# Patient Record
Sex: FEMALE | Race: WHITE | Employment: FULL TIME | URBAN - METROPOLITAN AREA
[De-identification: names, ages, dates, MRNs, and addresses within clinical notes are randomized per-mention and may not be internally consistent; named-entity substitution may affect disease eponyms.]

---
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
## 2020-04-27 ENCOUNTER — APPOINTMENT (OUTPATIENT)
Dept: CT IMAGING | Age: 20
End: 2020-04-27
Payer: COMMERCIAL

## 2020-04-27 ENCOUNTER — HOSPITAL ENCOUNTER (EMERGENCY)
Age: 20
Discharge: HOME | End: 2020-04-27
Attending: EMERGENCY MEDICINE
Payer: COMMERCIAL

## 2020-04-27 VITALS
RESPIRATION RATE: 18 BRPM | OXYGEN SATURATION: 98 % | TEMPERATURE: 98 F | WEIGHT: 226 LBS | DIASTOLIC BLOOD PRESSURE: 78 MMHG | SYSTOLIC BLOOD PRESSURE: 118 MMHG | HEART RATE: 102 BPM | BODY MASS INDEX: 35.4 KG/M2

## 2020-04-27 LAB
A/G RATIO: 1.4 (ref 1.1–2.2)
ALBUMIN SERPL-MCNC: 4.1 G/DL (ref 3.4–5)
ALP BLD-CCNC: 59 U/L (ref 40–129)
ALT SERPL-CCNC: 7 U/L (ref 10–40)
ANION GAP SERPL CALCULATED.3IONS-SCNC: 11 MMOL/L (ref 3–16)
AST SERPL-CCNC: 14 U/L (ref 15–37)
BACTERIA: ABNORMAL /HPF
BASOPHILS ABSOLUTE: 0 K/UL (ref 0–0.2)
BASOPHILS RELATIVE PERCENT: 0.3 %
BILIRUB SERPL-MCNC: 0.4 MG/DL (ref 0–1)
BILIRUBIN URINE: NEGATIVE
BLOOD, URINE: ABNORMAL
BUN BLDV-MCNC: 10 MG/DL (ref 7–20)
CALCIUM SERPL-MCNC: 9.3 MG/DL (ref 8.3–10.6)
CHLORIDE BLD-SCNC: 102 MMOL/L (ref 99–110)
CLARITY: CLEAR
CO2: 24 MMOL/L (ref 21–32)
COLOR: YELLOW
CREAT SERPL-MCNC: <0.5 MG/DL (ref 0.6–1.1)
CRYSTALS, UA: ABNORMAL /HPF
EOSINOPHILS ABSOLUTE: 0.1 K/UL (ref 0–0.6)
EOSINOPHILS RELATIVE PERCENT: 0.8 %
EPITHELIAL CELLS, UA: ABNORMAL /HPF (ref 0–5)
GFR AFRICAN AMERICAN: >60
GFR NON-AFRICAN AMERICAN: >60
GLOBULIN: 3 G/DL
GLUCOSE BLD-MCNC: 100 MG/DL (ref 70–99)
GLUCOSE URINE: NEGATIVE MG/DL
HCG QUALITATIVE: NEGATIVE
HCT VFR BLD CALC: 41.1 % (ref 36–48)
HEMOGLOBIN: 13.6 G/DL (ref 12–16)
KETONES, URINE: NEGATIVE MG/DL
LEUKOCYTE ESTERASE, URINE: NEGATIVE
LIPASE: 20 U/L (ref 13–60)
LYMPHOCYTES ABSOLUTE: 2.6 K/UL (ref 1–5.1)
LYMPHOCYTES RELATIVE PERCENT: 24.8 %
MCH RBC QN AUTO: 29.4 PG (ref 26–34)
MCHC RBC AUTO-ENTMCNC: 33 G/DL (ref 31–36)
MCV RBC AUTO: 88.9 FL (ref 80–100)
MICROSCOPIC EXAMINATION: YES
MONOCYTES ABSOLUTE: 0.7 K/UL (ref 0–1.3)
MONOCYTES RELATIVE PERCENT: 6.3 %
NEUTROPHILS ABSOLUTE: 7.2 K/UL (ref 1.7–7.7)
NEUTROPHILS RELATIVE PERCENT: 67.8 %
NITRITE, URINE: NEGATIVE
PDW BLD-RTO: 13.7 % (ref 12.4–15.4)
PH UA: 6 (ref 5–8)
PLATELET # BLD: 256 K/UL (ref 135–450)
PMV BLD AUTO: 8.3 FL (ref 5–10.5)
POTASSIUM SERPL-SCNC: 3.6 MMOL/L (ref 3.5–5.1)
PROTEIN UA: NEGATIVE MG/DL
RBC # BLD: 4.62 M/UL (ref 4–5.2)
RBC UA: ABNORMAL /HPF (ref 0–4)
SODIUM BLD-SCNC: 137 MMOL/L (ref 136–145)
SPECIFIC GRAVITY UA: 1.02 (ref 1–1.03)
TOTAL PROTEIN: 7.1 G/DL (ref 6.4–8.2)
URINE TYPE: ABNORMAL
UROBILINOGEN, URINE: 0.2 E.U./DL
WBC # BLD: 10.7 K/UL (ref 4–11)
WBC UA: ABNORMAL /HPF (ref 0–5)

## 2020-04-27 PROCEDURE — 96374 THER/PROPH/DIAG INJ IV PUSH: CPT

## 2020-04-27 PROCEDURE — 85025 COMPLETE CBC W/AUTO DIFF WBC: CPT

## 2020-04-27 PROCEDURE — 74176 CT ABD & PELVIS W/O CONTRAST: CPT

## 2020-04-27 PROCEDURE — 6370000000 HC RX 637 (ALT 250 FOR IP): Performed by: EMERGENCY MEDICINE

## 2020-04-27 PROCEDURE — 6360000002 HC RX W HCPCS: Performed by: EMERGENCY MEDICINE

## 2020-04-27 PROCEDURE — 84703 CHORIONIC GONADOTROPIN ASSAY: CPT

## 2020-04-27 PROCEDURE — 80053 COMPREHEN METABOLIC PANEL: CPT

## 2020-04-27 PROCEDURE — 99284 EMERGENCY DEPT VISIT MOD MDM: CPT

## 2020-04-27 PROCEDURE — 81001 URINALYSIS AUTO W/SCOPE: CPT

## 2020-04-27 PROCEDURE — 83690 ASSAY OF LIPASE: CPT

## 2020-04-27 RX ORDER — LANOLIN ALCOHOL/MO/W.PET/CERES
CREAM (GRAM) TOPICAL
Status: ON HOLD | COMMUNITY
Start: 2018-01-09 | End: 2020-05-20 | Stop reason: HOSPADM

## 2020-04-27 RX ORDER — HYDROCODONE BITARTRATE AND ACETAMINOPHEN 5; 325 MG/1; MG/1
1 TABLET ORAL ONCE
Status: COMPLETED | OUTPATIENT
Start: 2020-04-27 | End: 2020-04-27

## 2020-04-27 RX ORDER — ARIPIPRAZOLE 2 MG/1
TABLET ORAL
Status: ON HOLD | COMMUNITY
End: 2020-05-20 | Stop reason: HOSPADM

## 2020-04-27 RX ORDER — ACETAMINOPHEN AND CODEINE PHOSPHATE 300; 30 MG/1; MG/1
1 TABLET ORAL EVERY 4 HOURS PRN
Qty: 10 TABLET | Refills: 0 | Status: SHIPPED | OUTPATIENT
Start: 2020-04-27 | End: 2020-05-02

## 2020-04-27 RX ORDER — ONDANSETRON 4 MG/1
4 TABLET, ORALLY DISINTEGRATING ORAL EVERY 8 HOURS PRN
Qty: 10 TABLET | Refills: 0 | Status: ON HOLD | OUTPATIENT
Start: 2020-04-27 | End: 2020-05-20 | Stop reason: HOSPADM

## 2020-04-27 RX ORDER — KETOROLAC TROMETHAMINE 30 MG/ML
30 INJECTION, SOLUTION INTRAMUSCULAR; INTRAVENOUS ONCE
Status: COMPLETED | OUTPATIENT
Start: 2020-04-27 | End: 2020-04-27

## 2020-04-27 RX ADMIN — HYDROCODONE BITARTRATE AND ACETAMINOPHEN 1 TABLET: 5; 325 TABLET ORAL at 01:42

## 2020-04-27 RX ADMIN — KETOROLAC TROMETHAMINE 30 MG: 30 INJECTION, SOLUTION INTRAMUSCULAR at 01:04

## 2020-04-27 ASSESSMENT — PAIN SCALES - GENERAL: PAINLEVEL_OUTOF10: 8

## 2020-04-27 ASSESSMENT — PAIN DESCRIPTION - PAIN TYPE: TYPE: ACUTE PAIN

## 2020-04-27 ASSESSMENT — PAIN DESCRIPTION - LOCATION: LOCATION: ABDOMEN;BACK

## 2020-05-15 ENCOUNTER — HOSPITAL ENCOUNTER (EMERGENCY)
Age: 20
Discharge: ANOTHER HOSPITAL | End: 2020-05-16
Attending: EMERGENCY MEDICINE
Payer: COMMERCIAL

## 2020-05-15 PROCEDURE — 99285 EMERGENCY DEPT VISIT HI MDM: CPT

## 2020-05-16 ENCOUNTER — HOSPITAL ENCOUNTER (INPATIENT)
Age: 20
LOS: 4 days | Discharge: HOME | DRG: 885 | End: 2020-05-20
Attending: PSYCHIATRY & NEUROLOGY | Admitting: PSYCHIATRY & NEUROLOGY
Payer: COMMERCIAL

## 2020-05-16 VITALS
HEART RATE: 82 BPM | SYSTOLIC BLOOD PRESSURE: 148 MMHG | WEIGHT: 226 LBS | HEIGHT: 67 IN | DIASTOLIC BLOOD PRESSURE: 86 MMHG | TEMPERATURE: 98.1 F | BODY MASS INDEX: 35.47 KG/M2 | OXYGEN SATURATION: 100 % | RESPIRATION RATE: 12 BRPM

## 2020-05-16 PROBLEM — F60.3 BORDERLINE PERSONALITY DISORDER (HCC): Status: ACTIVE | Noted: 2020-05-16

## 2020-05-16 PROBLEM — F33.2 SEVERE EPISODE OF RECURRENT MAJOR DEPRESSIVE DISORDER, WITHOUT PSYCHOTIC FEATURES (HCC): Status: ACTIVE | Noted: 2020-05-16

## 2020-05-16 PROBLEM — F32.A DEPRESSION: Status: ACTIVE | Noted: 2020-05-16

## 2020-05-16 PROBLEM — F43.10 PTSD (POST-TRAUMATIC STRESS DISORDER): Status: ACTIVE | Noted: 2020-05-16

## 2020-05-16 LAB
A/G RATIO: 1.4 (ref 1.1–2.2)
ACETAMINOPHEN LEVEL: <5 UG/ML (ref 10–30)
ALBUMIN SERPL-MCNC: 4.2 G/DL (ref 3.4–5)
ALP BLD-CCNC: 54 U/L (ref 40–129)
ALT SERPL-CCNC: 9 U/L (ref 10–40)
AMPHETAMINE SCREEN, URINE: ABNORMAL
ANION GAP SERPL CALCULATED.3IONS-SCNC: 12 MMOL/L (ref 3–16)
AST SERPL-CCNC: 16 U/L (ref 15–37)
BARBITURATE SCREEN URINE: ABNORMAL
BASOPHILS ABSOLUTE: 0.1 K/UL (ref 0–0.2)
BASOPHILS RELATIVE PERCENT: 0.4 %
BENZODIAZEPINE SCREEN, URINE: ABNORMAL
BILIRUB SERPL-MCNC: 0.3 MG/DL (ref 0–1)
BUN BLDV-MCNC: 7 MG/DL (ref 7–20)
CALCIUM SERPL-MCNC: 9.2 MG/DL (ref 8.3–10.6)
CANNABINOID SCREEN URINE: POSITIVE
CHLORIDE BLD-SCNC: 104 MMOL/L (ref 99–110)
CO2: 23 MMOL/L (ref 21–32)
COCAINE METABOLITE SCREEN URINE: ABNORMAL
CREAT SERPL-MCNC: <0.5 MG/DL (ref 0.6–1.1)
EKG ATRIAL RATE: 96 BPM
EKG DIAGNOSIS: NORMAL
EKG P AXIS: 24 DEGREES
EKG P-R INTERVAL: 136 MS
EKG Q-T INTERVAL: 342 MS
EKG QRS DURATION: 78 MS
EKG QTC CALCULATION (BAZETT): 432 MS
EKG R AXIS: 40 DEGREES
EKG T AXIS: 30 DEGREES
EKG VENTRICULAR RATE: 96 BPM
EOSINOPHILS ABSOLUTE: 0.1 K/UL (ref 0–0.6)
EOSINOPHILS RELATIVE PERCENT: 0.7 %
ETHANOL: NORMAL MG/DL (ref 0–0.08)
GFR AFRICAN AMERICAN: >60
GFR NON-AFRICAN AMERICAN: >60
GLOBULIN: 2.9 G/DL
GLUCOSE BLD-MCNC: 102 MG/DL (ref 70–99)
HCG(URINE) PREGNANCY TEST: NEGATIVE
HCT VFR BLD CALC: 40.6 % (ref 36–48)
HEMOGLOBIN: 13.7 G/DL (ref 12–16)
LITHIUM DOSE AMOUNT: ABNORMAL
LITHIUM LEVEL: 0.3 MMOL/L (ref 0.6–1.2)
LYMPHOCYTES ABSOLUTE: 3.3 K/UL (ref 1–5.1)
LYMPHOCYTES RELATIVE PERCENT: 24.8 %
Lab: ABNORMAL
MCH RBC QN AUTO: 29.6 PG (ref 26–34)
MCHC RBC AUTO-ENTMCNC: 33.8 G/DL (ref 31–36)
MCV RBC AUTO: 87.7 FL (ref 80–100)
METHADONE SCREEN, URINE: ABNORMAL
MONOCYTES ABSOLUTE: 0.7 K/UL (ref 0–1.3)
MONOCYTES RELATIVE PERCENT: 5 %
NEUTROPHILS ABSOLUTE: 9.3 K/UL (ref 1.7–7.7)
NEUTROPHILS RELATIVE PERCENT: 69.1 %
OPIATE SCREEN URINE: ABNORMAL
OXYCODONE URINE: ABNORMAL
PDW BLD-RTO: 12.9 % (ref 12.4–15.4)
PH UA: 6
PHENCYCLIDINE SCREEN URINE: ABNORMAL
PLATELET # BLD: 259 K/UL (ref 135–450)
PMV BLD AUTO: 8.4 FL (ref 5–10.5)
POTASSIUM REFLEX MAGNESIUM: 3.8 MMOL/L (ref 3.5–5.1)
PROPOXYPHENE SCREEN: ABNORMAL
RBC # BLD: 4.63 M/UL (ref 4–5.2)
SALICYLATE, SERUM: <0.3 MG/DL (ref 15–30)
SARS-COV-2, NAAT: NOT DETECTED
SODIUM BLD-SCNC: 139 MMOL/L (ref 136–145)
TOTAL PROTEIN: 7.1 G/DL (ref 6.4–8.2)
WBC # BLD: 13.5 K/UL (ref 4–11)

## 2020-05-16 PROCEDURE — 80178 ASSAY OF LITHIUM: CPT

## 2020-05-16 PROCEDURE — 6370000000 HC RX 637 (ALT 250 FOR IP): Performed by: PSYCHIATRY & NEUROLOGY

## 2020-05-16 PROCEDURE — 99222 1ST HOSP IP/OBS MODERATE 55: CPT | Performed by: PHYSICIAN ASSISTANT

## 2020-05-16 PROCEDURE — G0480 DRUG TEST DEF 1-7 CLASSES: HCPCS

## 2020-05-16 PROCEDURE — 93005 ELECTROCARDIOGRAM TRACING: CPT | Performed by: EMERGENCY MEDICINE

## 2020-05-16 PROCEDURE — 84703 CHORIONIC GONADOTROPIN ASSAY: CPT

## 2020-05-16 PROCEDURE — 85025 COMPLETE CBC W/AUTO DIFF WBC: CPT

## 2020-05-16 PROCEDURE — 99223 1ST HOSP IP/OBS HIGH 75: CPT | Performed by: PSYCHIATRY & NEUROLOGY

## 2020-05-16 PROCEDURE — 80053 COMPREHEN METABOLIC PANEL: CPT

## 2020-05-16 PROCEDURE — U0002 COVID-19 LAB TEST NON-CDC: HCPCS

## 2020-05-16 PROCEDURE — 93010 ELECTROCARDIOGRAM REPORT: CPT | Performed by: INTERNAL MEDICINE

## 2020-05-16 PROCEDURE — 80307 DRUG TEST PRSMV CHEM ANLYZR: CPT

## 2020-05-16 PROCEDURE — 1240000000 HC EMOTIONAL WELLNESS R&B

## 2020-05-16 PROCEDURE — 6370000000 HC RX 637 (ALT 250 FOR IP): Performed by: EMERGENCY MEDICINE

## 2020-05-16 RX ORDER — DULOXETIN HYDROCHLORIDE 30 MG/1
30 CAPSULE, DELAYED RELEASE ORAL 2 TIMES DAILY
Status: DISCONTINUED | OUTPATIENT
Start: 2020-05-16 | End: 2020-05-20 | Stop reason: HOSPADM

## 2020-05-16 RX ORDER — FERROUS SULFATE 325(65) MG
325 TABLET ORAL 2 TIMES DAILY WITH MEALS
Status: DISCONTINUED | OUTPATIENT
Start: 2020-05-16 | End: 2020-05-20 | Stop reason: HOSPADM

## 2020-05-16 RX ORDER — NICOTINE 21 MG/24HR
1 PATCH, TRANSDERMAL 24 HOURS TRANSDERMAL ONCE
Status: DISCONTINUED | OUTPATIENT
Start: 2020-05-16 | End: 2020-05-16 | Stop reason: HOSPADM

## 2020-05-16 RX ORDER — BUSPIRONE HYDROCHLORIDE 10 MG/1
10 TABLET ORAL 3 TIMES DAILY
Status: DISCONTINUED | OUTPATIENT
Start: 2020-05-16 | End: 2020-05-19

## 2020-05-16 RX ORDER — LITHIUM CARBONATE 300 MG/1
600 CAPSULE ORAL
Status: ON HOLD | COMMUNITY
End: 2020-05-20 | Stop reason: HOSPADM

## 2020-05-16 RX ORDER — IBUPROFEN 400 MG/1
400 TABLET ORAL EVERY 6 HOURS PRN
Status: DISCONTINUED | OUTPATIENT
Start: 2020-05-16 | End: 2020-05-20 | Stop reason: HOSPADM

## 2020-05-16 RX ORDER — HYDROXYZINE PAMOATE 50 MG/1
50 CAPSULE ORAL 3 TIMES DAILY PRN
Status: DISCONTINUED | OUTPATIENT
Start: 2020-05-16 | End: 2020-05-20 | Stop reason: HOSPADM

## 2020-05-16 RX ORDER — MAGNESIUM HYDROXIDE/ALUMINUM HYDROXICE/SIMETHICONE 120; 1200; 1200 MG/30ML; MG/30ML; MG/30ML
30 SUSPENSION ORAL EVERY 6 HOURS PRN
Status: DISCONTINUED | OUTPATIENT
Start: 2020-05-16 | End: 2020-05-20 | Stop reason: HOSPADM

## 2020-05-16 RX ORDER — BENZTROPINE MESYLATE 1 MG/ML
2 INJECTION INTRAMUSCULAR; INTRAVENOUS 2 TIMES DAILY PRN
Status: DISCONTINUED | OUTPATIENT
Start: 2020-05-16 | End: 2020-05-18

## 2020-05-16 RX ORDER — LITHIUM CARBONATE 300 MG/1
300 TABLET, FILM COATED, EXTENDED RELEASE ORAL EVERY 12 HOURS SCHEDULED
Status: DISCONTINUED | OUTPATIENT
Start: 2020-05-16 | End: 2020-05-20 | Stop reason: HOSPADM

## 2020-05-16 RX ORDER — TRAZODONE HYDROCHLORIDE 50 MG/1
50 TABLET ORAL NIGHTLY
Status: DISCONTINUED | OUTPATIENT
Start: 2020-05-16 | End: 2020-05-20 | Stop reason: HOSPADM

## 2020-05-16 RX ORDER — NICOTINE 21 MG/24HR
1 PATCH, TRANSDERMAL 24 HOURS TRANSDERMAL DAILY
Status: DISCONTINUED | OUTPATIENT
Start: 2020-05-16 | End: 2020-05-17

## 2020-05-16 RX ORDER — DULOXETIN HYDROCHLORIDE 60 MG/1
60 CAPSULE, DELAYED RELEASE ORAL 2 TIMES DAILY
Status: DISCONTINUED | OUTPATIENT
Start: 2020-05-16 | End: 2020-05-16

## 2020-05-16 RX ORDER — TRAZODONE HYDROCHLORIDE 50 MG/1
50 TABLET ORAL NIGHTLY PRN
Status: DISCONTINUED | OUTPATIENT
Start: 2020-05-16 | End: 2020-05-16

## 2020-05-16 RX ORDER — ACETAMINOPHEN 325 MG/1
650 TABLET ORAL EVERY 4 HOURS PRN
Status: DISCONTINUED | OUTPATIENT
Start: 2020-05-16 | End: 2020-05-20 | Stop reason: HOSPADM

## 2020-05-16 RX ORDER — DULOXETIN HYDROCHLORIDE 30 MG/1
30 CAPSULE, DELAYED RELEASE ORAL
Status: ON HOLD | COMMUNITY
End: 2020-05-20 | Stop reason: HOSPADM

## 2020-05-16 RX ORDER — OLANZAPINE 10 MG/1
10 TABLET ORAL
Status: ACTIVE | OUTPATIENT
Start: 2020-05-16 | End: 2020-05-16

## 2020-05-16 RX ORDER — OLANZAPINE 10 MG/1
10 INJECTION, POWDER, LYOPHILIZED, FOR SOLUTION INTRAMUSCULAR
Status: ACTIVE | OUTPATIENT
Start: 2020-05-16 | End: 2020-05-16

## 2020-05-16 RX ORDER — LITHIUM CARBONATE 300 MG/1
600 TABLET, FILM COATED, EXTENDED RELEASE ORAL EVERY 12 HOURS SCHEDULED
Status: DISCONTINUED | OUTPATIENT
Start: 2020-05-16 | End: 2020-05-16

## 2020-05-16 RX ADMIN — BUSPIRONE HYDROCHLORIDE 10 MG: 10 TABLET ORAL at 19:21

## 2020-05-16 RX ADMIN — HYDROXYZINE PAMOATE 50 MG: 50 CAPSULE ORAL at 18:03

## 2020-05-16 RX ADMIN — NICOTINE POLACRILEX 2 MG: 2 GUM, CHEWING BUCCAL at 11:30

## 2020-05-16 RX ADMIN — HYDROXYZINE PAMOATE 50 MG: 50 CAPSULE ORAL at 13:17

## 2020-05-16 RX ADMIN — DULOXETINE HYDROCHLORIDE 60 MG: 60 CAPSULE, DELAYED RELEASE ORAL at 11:52

## 2020-05-16 RX ADMIN — NICOTINE POLACRILEX 2 MG: 2 GUM, CHEWING BUCCAL at 15:30

## 2020-05-16 RX ADMIN — LITHIUM CARBONATE 300 MG: 300 TABLET, FILM COATED, EXTENDED RELEASE ORAL at 21:08

## 2020-05-16 RX ADMIN — DULOXETINE HYDROCHLORIDE 30 MG: 30 CAPSULE, DELAYED RELEASE ORAL at 21:08

## 2020-05-16 RX ADMIN — LITHIUM CARBONATE 600 MG: 300 TABLET, FILM COATED, EXTENDED RELEASE ORAL at 11:52

## 2020-05-16 RX ADMIN — NICOTINE POLACRILEX 2 MG: 2 GUM, CHEWING BUCCAL at 18:03

## 2020-05-16 RX ADMIN — FERROUS SULFATE TAB 325 MG (65 MG ELEMENTAL FE) 325 MG: 325 (65 FE) TAB at 18:06

## 2020-05-16 RX ADMIN — TRAZODONE HYDROCHLORIDE 50 MG: 50 TABLET ORAL at 21:08

## 2020-05-16 RX ADMIN — NICOTINE POLACRILEX 2 MG: 2 GUM, CHEWING BUCCAL at 21:40

## 2020-05-16 ASSESSMENT — SLEEP AND FATIGUE QUESTIONNAIRES
DIFFICULTY STAYING ASLEEP: YES
DIFFICULTY ARISING: NO
SLEEP PATTERN: DIFFICULTY FALLING ASLEEP;DISTURBED/INTERRUPTED SLEEP;NIGHTMARES/TERRORS;DIFFICULTY ARISING
DO YOU HAVE DIFFICULTY SLEEPING: YES
RESTFUL SLEEP: NO
DIFFICULTY FALLING ASLEEP: NO
SLEEP PATTERN: INSOMNIA;RESTLESSNESS
DO YOU USE A SLEEP AID: NO
DIFFICULTY ARISING: YES
DO YOU USE A SLEEP AID: YES
AVERAGE NUMBER OF SLEEP HOURS: 6
DIFFICULTY STAYING ASLEEP: NO
DIFFICULTY FALLING ASLEEP: YES
DO YOU HAVE DIFFICULTY SLEEPING: YES
RESTFUL SLEEP: NO
AVERAGE NUMBER OF SLEEP HOURS: 4

## 2020-05-16 ASSESSMENT — PATIENT HEALTH QUESTIONNAIRE - PHQ9
SUM OF ALL RESPONSES TO PHQ QUESTIONS 1-9: 23
SUM OF ALL RESPONSES TO PHQ QUESTIONS 1-9: 15

## 2020-05-16 ASSESSMENT — LIFESTYLE VARIABLES
HISTORY_ALCOHOL_USE: NO
HISTORY_ALCOHOL_USE: NO

## 2020-05-16 NOTE — BH NOTE
[facility]  Initial Interdisciplinary Treatment Plan NOTE    Review Date & Time: 05/16/2020 0900    Patient was not in treatment team    Admission Type:   Admission Type: Voluntary    Reason for admission:  Reason for Admission: threatened to committ suicide, overhwlemed, multiple stressors. Estimated Length of Stay Update:  1-3 days   Estimated Discharge Date Update: 1-3 days     PATIENT STRENGTHS:  Patient Strengths Strengths: Communication, Positive Support, Connection to output provider, Social Skills, Employment  Patient Strengths and Limitations:Limitations: Tendency to isolate self, External locus of control  Addictive Behavior:Addictive Behavior  In the past 3 months, have you felt or has someone told you that you have a problem with:  : None  Do you have a history of Chemical Use?: No  Do you have a history of Alcohol Use?: No  Do you have a history of Street Drug Abuse?: Yes  Histroy of Prescripton Drug Abuse?: No  Medical Problems:  Past Medical History:   Diagnosis Date    Depression     Lissa-Danlos syndrome        EDUCATION:   Learner Progress Toward Treatment Goals: Reviewed results and recommendations of this team    Method: Individual    Outcome: Verbalized understanding    PATIENT GOALS: \" to talk with doctor. \"     PLAN/TREATMENT RECOMMENDATIONS UPDATE: maintain safety, medication management     GOALS UPDATE:   Time frame for Short-Term Goals:  By time of discharge     Cj Watson RN

## 2020-05-16 NOTE — BH NOTE
Pt c/o increased anxiety, rpts vistaril has not been effective  Having fleeting thoughts of self-harming (hx of biting self on hand). But has not harmed self presently and does not think she will, \"do it\"  Schedule Buspar given for anxiety. PT does contract for safety. Prince Meyers

## 2020-05-16 NOTE — ED PROVIDER NOTES
visualized and preliminarily interpreted by the emergency physician with the below findings:        Interpretation per the Radiologist below, if available at the time of this note:    No orders to display         ED BEDSIDE ULTRASOUND:   Performed by ED Physician - none    LABS:  Labs Reviewed   CBC WITH AUTO DIFFERENTIAL - Abnormal; Notable for the following components:       Result Value    WBC 13.5 (*)     Neutrophils Absolute 9.3 (*)     All other components within normal limits    Narrative:     Performed at:  [address], [address] [place]   Phone [phone]   COMPREHENSIVE METABOLIC PANEL W/ REFLEX TO MG FOR LOW K - Abnormal; Notable for the following components:    Glucose 102 (*)     CREATININE <0.5 (*)     ALT 9 (*)     All other components within normal limits    Narrative:     Performed at:  [address], [email or web address] [place]   Phone [phone] LEVEL - Abnormal; Notable for the following components:    Acetaminophen Level <5 (*)     All other components within normal limits    Narrative:     Performed at:  [address], [facility] [place]   Phone [phone]   SALICYLATE LEVEL - Abnormal; Notable for the following components:    Salicylate, Serum <7.7 (*)     All other components within normal limits    Narrative:     Performed at:  [address],  [place], [address] [place]   Phone [phone]   LITHIUM LEVEL - Abnormal; Notable for the following components:    Lithium Lvl 0.3 (*)     All other components within normal limits    Narrative:     Performed at:  [address], [address] [place]   Phone [phone]   PREGNANCY, URINE    Narrative:     Performed at:  [address] [place] [address] Laboratory  [address] [place] [address], [place], [place]   Phone [phone]   URINE DRUG SCREEN    Narrative:     Performed at:  [facility]) [facility]  [address] [place] [address],  [place] Ramírez [place]   Phone [phone]   ETHANOL    Narrative:     Performed at:  [facility]) [address],  [place]   Phone [phone]       All other labs were within normal range or not returned as of this dictation. EMERGENCY DEPARTMENT COURSE and DIFFERENTIAL DIAGNOSIS/MDM:   Vitals:    Vitals:    05/16/20 0004   BP: 130/76   Pulse: 97   Resp: 16   Temp: 98.1 °F (36.7 °C)   TempSrc: Oral   SpO2: 97%   Weight: 226 lb (102.5 kg)   Height: 5' 7\" (1.702 m)     Adult female who comes in for suicidal ideation. Patient is placed on suicide precautions. No medical complaints. Basic laboratory studies are unremarkable except for nonspecific mild leukocytosis. At this point time patient is medically cleared for psychiatric evaluation. Patient has been accepted by Dr. Francisco Mercado for inpatient psychiatric evaluation. CRITICAL CARE TIME   None       CONSULTS:  IP CONSULT TO HOSPITALIST    PROCEDURES:  Unless otherwise noted above, none     Procedures    FINAL IMPRESSION      1. Depression with suicidal ideation          DISPOSITION/PLAN   DISPOSITION        PATIENT REFERREDTO:  No follow-up provider specified.     DISCHARGEMEDICATIONS:  New Prescriptions    No medications on file          (Please note that portions of this note were completed with a voice recognition program.  Efforts were made to edit the dictations but occasionally words are mis-transcribed.)    Maddie Roy MD (electronically signed)  Attending Emergency Physician        Maddie Roy MD  05/16/20 Fredy Boone MD  05/16/20 0152

## 2020-05-16 NOTE — BH NOTE
Called to verify home medications at [facility]     Lithium 300 mg Daily   Zyprexa 2.5 mg 1-2 tablets  Cymbalta 60 mg BID  Zofran 4mg TID PRN

## 2020-05-16 NOTE — ED NOTES
Consent for Telehealth Services signed by pt and witnessed by this RN.       Harvey Cardona RN  05/16/20 4264

## 2020-05-16 NOTE — H&P
and Morphine    Social History:  The patient currently lives with family. TOBACCO:   reports that she has never smoked. She has never used smokeless tobacco.  ETOH:   reports no history of alcohol use. Family History:   Positive as follows:        Problem Relation Age of Onset    Mental Illness Mother     Other Mother     Mental Illness Maternal Cousin        REVIEW OF SYSTEMS:     Constitutional: Negative for fever   HENT: Negative for sore throat   Eyes: Negative for redness   Respiratory: Negative  for dyspnea, cough   Cardiovascular: Negative for chest pain   Gastrointestinal: Negative for vomiting, diarrhea   Genitourinary: Negative for hematuria   Musculoskeletal: Negative for arthralgias   Skin: Negative for rash   Neurological: Negative for syncope   Hematological: Negative for adenopathy   Psychiatric/Behavorial: Negative for anxiety    PHYSICAL EXAM:    /82   Pulse 88   Temp 98.8 °F (37.1 °C)   Resp 18   LMP 04/20/2020 (Exact Date)   SpO2 99%   Breastfeeding No   Gen: No distress. Alert. Young, obese,  female  Eyes: PERRL. No sclera icterus. No conjunctival injection. ENT: No discharge. Pharynx clear. Neck:  Trachea midline. Resp: No accessory muscle use. No crackles. No wheezes. No rhonchi. CV: Regular rate. Regular rhythm. No murmur. No rub. No edema. GI: Soft, Non-tender. Non-distended. Normal bowel sounds. Skin: Warm and dry. No rash on exposed extremities. M/S: No cyanosis. No clubbing. Neuro: Awake.  Grossly nonfocal, CN II-XII intact    Psych: Defer to psychiatry    CBC:   Recent Labs     05/16/20  0010   WBC 13.5*   HGB 13.7   HCT 40.6   MCV 87.7        BMP:   Recent Labs     05/16/20  0010      K 3.8      CO2 23   BUN 7   CREATININE <0.5*     LIVER PROFILE:   Recent Labs     05/16/20  0010   AST 16   ALT 9*   BILITOT 0.3   ALKPHOS 54     UA:  Recent Labs     05/16/20 0030   PHUR 6.0      U/A:    Lab Results   Component Value Date
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
## 2020-05-16 NOTE — PROGRESS NOTES
Patient belongings placed in patient bag - bra, shirt, pants, purse, cell phone, pair of shoes. Patient placed in paper safety gown.

## 2020-05-17 LAB
CHOLESTEROL, TOTAL: 197 MG/DL (ref 0–199)
ESTIMATED AVERAGE GLUCOSE: 91.1 MG/DL
HBA1C MFR BLD: 4.8 %
HDLC SERPL-MCNC: 87 MG/DL (ref 40–60)
LDL CHOLESTEROL CALCULATED: 94 MG/DL
TRIGL SERPL-MCNC: 78 MG/DL (ref 0–150)
TSH SERPL DL<=0.05 MIU/L-ACNC: 1.12 UIU/ML (ref 0.27–4.2)
VLDLC SERPL CALC-MCNC: 16 MG/DL

## 2020-05-17 PROCEDURE — 36415 COLL VENOUS BLD VENIPUNCTURE: CPT

## 2020-05-17 PROCEDURE — 83036 HEMOGLOBIN GLYCOSYLATED A1C: CPT

## 2020-05-17 PROCEDURE — 80061 LIPID PANEL: CPT

## 2020-05-17 PROCEDURE — 6370000000 HC RX 637 (ALT 250 FOR IP): Performed by: PSYCHIATRY & NEUROLOGY

## 2020-05-17 PROCEDURE — 1240000000 HC EMOTIONAL WELLNESS R&B

## 2020-05-17 PROCEDURE — 84443 ASSAY THYROID STIM HORMONE: CPT

## 2020-05-17 RX ORDER — NICOTINE 21 MG/24HR
1 PATCH, TRANSDERMAL 24 HOURS TRANSDERMAL DAILY PRN
Status: DISCONTINUED | OUTPATIENT
Start: 2020-05-17 | End: 2020-05-20 | Stop reason: HOSPADM

## 2020-05-17 RX ORDER — LORAZEPAM 1 MG/1
1 TABLET ORAL EVERY 12 HOURS PRN
Status: DISCONTINUED | OUTPATIENT
Start: 2020-05-17 | End: 2020-05-18

## 2020-05-17 RX ADMIN — BUSPIRONE HYDROCHLORIDE 10 MG: 10 TABLET ORAL at 19:59

## 2020-05-17 RX ADMIN — LORAZEPAM 1 MG: 1 TABLET ORAL at 10:02

## 2020-05-17 RX ADMIN — BUSPIRONE HYDROCHLORIDE 10 MG: 10 TABLET ORAL at 08:00

## 2020-05-17 RX ADMIN — LORAZEPAM 1 MG: 1 TABLET ORAL at 22:13

## 2020-05-17 RX ADMIN — NICOTINE POLACRILEX 2 MG: 2 GUM, CHEWING BUCCAL at 17:44

## 2020-05-17 RX ADMIN — LITHIUM CARBONATE 300 MG: 300 TABLET, FILM COATED, EXTENDED RELEASE ORAL at 08:00

## 2020-05-17 RX ADMIN — FERROUS SULFATE TAB 325 MG (65 MG ELEMENTAL FE) 325 MG: 325 (65 FE) TAB at 08:00

## 2020-05-17 RX ADMIN — NICOTINE POLACRILEX 2 MG: 2 GUM, CHEWING BUCCAL at 08:40

## 2020-05-17 RX ADMIN — TRAZODONE HYDROCHLORIDE 50 MG: 50 TABLET ORAL at 19:58

## 2020-05-17 RX ADMIN — HYDROXYZINE PAMOATE 50 MG: 50 CAPSULE ORAL at 03:32

## 2020-05-17 RX ADMIN — FERROUS SULFATE TAB 325 MG (65 MG ELEMENTAL FE) 325 MG: 325 (65 FE) TAB at 17:45

## 2020-05-17 RX ADMIN — LITHIUM CARBONATE 300 MG: 300 TABLET, FILM COATED, EXTENDED RELEASE ORAL at 19:59

## 2020-05-17 RX ADMIN — DULOXETINE HYDROCHLORIDE 30 MG: 30 CAPSULE, DELAYED RELEASE ORAL at 19:59

## 2020-05-17 RX ADMIN — NICOTINE POLACRILEX 4 MG: 4 GUM, CHEWING BUCCAL at 19:11

## 2020-05-17 RX ADMIN — NICOTINE POLACRILEX 2 MG: 2 GUM, CHEWING BUCCAL at 13:45

## 2020-05-17 RX ADMIN — NICOTINE POLACRILEX 4 MG: 4 GUM, CHEWING BUCCAL at 22:16

## 2020-05-17 RX ADMIN — BUSPIRONE HYDROCHLORIDE 10 MG: 10 TABLET ORAL at 15:44

## 2020-05-17 RX ADMIN — DULOXETINE HYDROCHLORIDE 30 MG: 30 CAPSULE, DELAYED RELEASE ORAL at 08:00

## 2020-05-17 ASSESSMENT — PAIN SCALES - GENERAL: PAINLEVEL_OUTOF10: 0

## 2020-05-17 NOTE — PLAN OF CARE
Problem: Anxiety:  Goal: Level of anxiety will decrease  Description: Level of anxiety will decrease  Outcome: Ongoing   Ev reports anxiety this shift. Patient states her suicidal thoughts are \"always there. \" Patient denies HI, denies A/V/H and contracts for safety. PRN Vistaril given as requested for anxiety. Medication effective.

## 2020-05-17 NOTE — PLAN OF CARE
Problem: Suicide risk  Description: Suicide risk  Goal: Provide patient with safe environment  Description: Provide patient with safe environment  5/17/2020 1014 by Jamey Ordaz RN  Note: Patient denies suicidal ideation. She states that she is feeling good this morning and that she believes the medication changes made yesterday were beneficial. Continues to endorse anxiety.

## 2020-05-17 NOTE — GROUP NOTE
Group Therapy Note    Date: 5/17/2020    Group Start Time: 1:30 pm  Group End Time: 2:30 pm  Group Topic: Cognitive Skills    [address]        Group Therapy Note    Attendees: 6       Patient's Goal: Pt will participate in yoga and meditation. Notes: Pt participated and was appropriate in group. Status After Intervention:  Improved    Participation Level:  Active Listener and Interactive    Participation Quality: Appropriate, Attentive, Sharing and Supportive      Speech:  normal      Thought Process/Content: Logical      Affective Functioning: Congruent      Mood: euthymic      Level of consciousness:  Alert and Oriented x4      Response to Learning: Able to verbalize current knowledge/experience, Able to verbalize/acknowledge new learning and Progressing to goal      Endings: None Reported    Modes of Intervention: Activity and Movement      Discipline Responsible: /Counselor      Signature:  GHULAM Ibarra
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
## 2020-05-18 PROCEDURE — 6370000000 HC RX 637 (ALT 250 FOR IP): Performed by: PSYCHIATRY & NEUROLOGY

## 2020-05-18 PROCEDURE — 97165 OT EVAL LOW COMPLEX 30 MIN: CPT

## 2020-05-18 PROCEDURE — 1240000000 HC EMOTIONAL WELLNESS R&B

## 2020-05-18 PROCEDURE — 99233 SBSQ HOSP IP/OBS HIGH 50: CPT | Performed by: PSYCHIATRY & NEUROLOGY

## 2020-05-18 PROCEDURE — 97535 SELF CARE MNGMENT TRAINING: CPT

## 2020-05-18 RX ORDER — HYDROXYZINE PAMOATE 25 MG/1
25 CAPSULE ORAL
Status: ACTIVE | OUTPATIENT
Start: 2020-05-18 | End: 2020-05-18

## 2020-05-18 RX ORDER — CLONAZEPAM 0.5 MG/1
0.5 TABLET ORAL EVERY 6 HOURS PRN
Status: DISCONTINUED | OUTPATIENT
Start: 2020-05-18 | End: 2020-05-19

## 2020-05-18 RX ADMIN — NICOTINE POLACRILEX 4 MG: 4 GUM, CHEWING BUCCAL at 22:42

## 2020-05-18 RX ADMIN — LORAZEPAM 1 MG: 1 TABLET ORAL at 10:23

## 2020-05-18 RX ADMIN — FERROUS SULFATE TAB 325 MG (65 MG ELEMENTAL FE) 325 MG: 325 (65 FE) TAB at 08:30

## 2020-05-18 RX ADMIN — NICOTINE POLACRILEX 4 MG: 4 GUM, CHEWING BUCCAL at 14:18

## 2020-05-18 RX ADMIN — LITHIUM CARBONATE 300 MG: 300 TABLET, FILM COATED, EXTENDED RELEASE ORAL at 08:30

## 2020-05-18 RX ADMIN — BUSPIRONE HYDROCHLORIDE 10 MG: 10 TABLET ORAL at 20:35

## 2020-05-18 RX ADMIN — BUSPIRONE HYDROCHLORIDE 10 MG: 10 TABLET ORAL at 14:18

## 2020-05-18 RX ADMIN — NICOTINE POLACRILEX 4 MG: 4 GUM, CHEWING BUCCAL at 20:35

## 2020-05-18 RX ADMIN — NICOTINE POLACRILEX 4 MG: 4 GUM, CHEWING BUCCAL at 10:23

## 2020-05-18 RX ADMIN — HYDROXYZINE PAMOATE 50 MG: 50 CAPSULE ORAL at 19:42

## 2020-05-18 RX ADMIN — TRAZODONE HYDROCHLORIDE 50 MG: 50 TABLET ORAL at 20:35

## 2020-05-18 RX ADMIN — CLONAZEPAM 0.5 MG: 0.5 TABLET ORAL at 19:42

## 2020-05-18 RX ADMIN — FERROUS SULFATE TAB 325 MG (65 MG ELEMENTAL FE) 325 MG: 325 (65 FE) TAB at 17:37

## 2020-05-18 RX ADMIN — CLONAZEPAM 0.5 MG: 0.5 TABLET ORAL at 13:17

## 2020-05-18 RX ADMIN — BUSPIRONE HYDROCHLORIDE 10 MG: 10 TABLET ORAL at 08:30

## 2020-05-18 RX ADMIN — DULOXETINE HYDROCHLORIDE 30 MG: 30 CAPSULE, DELAYED RELEASE ORAL at 20:35

## 2020-05-18 RX ADMIN — NICOTINE POLACRILEX 4 MG: 4 GUM, CHEWING BUCCAL at 17:38

## 2020-05-18 RX ADMIN — HYDROXYZINE PAMOATE 50 MG: 50 CAPSULE ORAL at 15:12

## 2020-05-18 RX ADMIN — DULOXETINE HYDROCHLORIDE 30 MG: 30 CAPSULE, DELAYED RELEASE ORAL at 08:30

## 2020-05-18 RX ADMIN — NICOTINE POLACRILEX 4 MG: 4 GUM, CHEWING BUCCAL at 08:30

## 2020-05-18 RX ADMIN — LITHIUM CARBONATE 300 MG: 300 TABLET, FILM COATED, EXTENDED RELEASE ORAL at 20:35

## 2020-05-18 RX ADMIN — HYDROXYZINE PAMOATE 50 MG: 50 CAPSULE ORAL at 04:43

## 2020-05-18 NOTE — PROGRESS NOTES
Department of Psychiatry  Attending Progress Note  Chief Complaint: depression  Beverly Worthy has been active in program and bright affectively. She stated that her anxiety remains elevated and ATivan is not helpful . She is new to [place]. She spent most of our time talking a bout how she would like to move out with a friend to [place]. Dad will be deployed next week to [facility] for up to a year, so patient is worried about her mother's health(Lupus) but is still planning to move out of parent's home. Plans to enroll in EMT school at [employer]. Not currently suicidal.   Discussed changing Ativan to Clonazepam temporarily. Patient's chart was reviewed and collaborated with  about the treatment plan. SUBJECTIVE:    Patient is feeling better. Suicidal ideation:  denies suicidal ideation. Patient does not have medication side effects. ROS: Patient has new complaints: no  Sleeping adequately:  Yes   Appetite adequate: Yes  Attending groups: Yes  Visitors:No    OBJECTIVE    Physical  VITALS:  /86   Pulse 107   Temp 98.2 °F (36.8 °C) (Oral)   Resp 14   Ht 5' 8\" (1.727 m)   Wt 240 lb (108.9 kg)   LMP 04/20/2020 (Exact Date)   SpO2 97%   Breastfeeding No   BMI 36.49 kg/m²     Mental Status Examination:  Patients appearance was street clothes. Thoughts are Goal directed. Homicidal ideations none. No abnormal movements, tics or mannerisms. Memory intact Aims 0. Concentration Good. Alert and oriented X 4. Insight and Judgement impaired insight. Patient was cooperative. Patient gait normal. Mood anxious, affect anxiety Hallucinations Absent, suicidal ideations no specific plan to harm self Speech normal volume  Data  Labs:   Admission on 05/16/2020   Component Date Value Ref Range Status    SARS-CoV-2, NAAT 05/16/2020 Not Detected  Not Detected Final    Comment: This test has been authorized by FDA under an  Emergency Use Authorization (EUA).   This test is only authorized for the day  DULoxetine (CYMBALTA) extended release capsule 30 mg, 30 mg, Oral, BID  busPIRone (BUSPAR) tablet 10 mg, 10 mg, Oral, TID  traZODone (DESYREL) tablet 50 mg, 50 mg, Oral, Nightly    ASSESSMENT AND PLAN    Principal Problem:    Borderline personality disorder (HCC)  Active Problems:    Leukocytosis    Chronic diarrhea    Lissa-Danlos syndrome    Tobacco abuse    Cannabis abuse    Severe episode of recurrent major depressive disorder, without psychotic features ([facility] [address]    PTSD (post-traumatic stress disorder)  Resolved Problems:    * No resolved hospital problems. *       1. Patient s symptoms   are improving  2. Probable discharge is 1-2 days   3. Discharge planning is complete. Look for a new provider at patient's request  4. Suicidal ideation is better  5. Total time with patient was 40 minutes and more than 50 % of that time was spent counseling the patient on their symptoms, treatment and expected goals.

## 2020-05-18 NOTE — GROUP NOTE
Group Therapy Note    Date: 5/18/2020    Group Start Time: 1110  Group End Time: [address]        Group Therapy Note    Attendees: 11    Patient's Goal: to participate in Blackout Songwriting intervention to increase self-expression, decrease feelings of isolation, and increase cognitive stimulation. Notes: Ev actively participated in group throughout. Pt shared her work with peers and was receptive to feedback and support given by peers. Ev appropriately discussed peers work. Pt actively participated in group processing discussion and verbalized learning. Status After Intervention:  Improved    Participation Level:  Active Listener and Interactive    Participation Quality: Appropriate, Attentive, Sharing and Supportive      Speech:  normal      Thought Process/Content: Linear      Affective Functioning: Constricted/Restricted      Mood: depressed      Level of consciousness:  Alert, Oriented x4 and Attentive      Response to Learning: Able to verbalize current knowledge/experience, Able to verbalize/acknowledge new learning, Capable of insight and Progressing to goal      Endings: None Reported    Modes of Intervention: Education, Support, Socialization, Exploration, Problem-solving, Activity, Movement and Media      Discipline Responsible: Psychoeducational Specialist      Signature:  DANITA Moseley

## 2020-05-18 NOTE — BH NOTE
Patient came to desk stating she was having an increase in her anxiety. She rates it as  7 our of 10. Patient given ativan 1 mg per order. Will continue to monitor.

## 2020-05-18 NOTE — BH NOTE
Suicide precautions discontinued. Per Dr. Justin Watson there was never a need for suicide precautions after she was admitted to unit. Patient currently bright, visible and social. Denies SI/HI. Will continue to monitor.

## 2020-05-18 NOTE — BH NOTE
Patient come to desk requesting medication for anxiety. She states it is an 8 out of 10. Patient given Klonopin 0.5 mg Will continue to monitor.

## 2020-05-18 NOTE — GROUP NOTE
Group Therapy Note    Date: 5/18/2020    Group Start Time: 9969  Group End Time: 0793  [facility]        Group Therapy Note    Attendees: 6         Patient's Goal:  Patient will complete worksheet on acceptance. Will discuss how acceptance in life contributes to positive mental health. Notes:  Patient attended group. Completed the worksheet and discussed. Appeared to understand the basic concepts and gave examples from her life. Status After Intervention:  Improved    Participation Level:  Active Listener and Interactive    Participation Quality: Appropriate, Attentive, Sharing and Supportive    Speech:  normal    Thought Process/Content: Logical Linear    Affective Functioning: Congruent    Mood: anxious and depressed    Level of consciousness:  Oriented x4    Response to Learning: Able to verbalize current knowledge/experience    Endings: None Reported    Modes of Intervention: Education, Support, Socialization and Exploration    Discipline Responsible: /Counselor    Signature:  Geraldine Chavez [facility]

## 2020-05-19 PROCEDURE — 1240000000 HC EMOTIONAL WELLNESS R&B

## 2020-05-19 PROCEDURE — 6370000000 HC RX 637 (ALT 250 FOR IP): Performed by: PSYCHIATRY & NEUROLOGY

## 2020-05-19 PROCEDURE — 99233 SBSQ HOSP IP/OBS HIGH 50: CPT | Performed by: PSYCHIATRY & NEUROLOGY

## 2020-05-19 RX ORDER — BUSPIRONE HYDROCHLORIDE 7.5 MG/1
15 TABLET ORAL 3 TIMES DAILY
Status: DISCONTINUED | OUTPATIENT
Start: 2020-05-19 | End: 2020-05-20 | Stop reason: HOSPADM

## 2020-05-19 RX ORDER — TRAZODONE HYDROCHLORIDE 50 MG/1
50 TABLET ORAL ONCE
Status: DISCONTINUED | OUTPATIENT
Start: 2020-05-19 | End: 2020-05-20 | Stop reason: HOSPADM

## 2020-05-19 RX ORDER — CLONAZEPAM 1 MG/1
1 TABLET ORAL EVERY 6 HOURS PRN
Status: DISCONTINUED | OUTPATIENT
Start: 2020-05-19 | End: 2020-05-20 | Stop reason: HOSPADM

## 2020-05-19 RX ADMIN — DULOXETINE HYDROCHLORIDE 30 MG: 30 CAPSULE, DELAYED RELEASE ORAL at 21:05

## 2020-05-19 RX ADMIN — NICOTINE POLACRILEX 4 MG: 4 GUM, CHEWING BUCCAL at 09:03

## 2020-05-19 RX ADMIN — DULOXETINE HYDROCHLORIDE 30 MG: 30 CAPSULE, DELAYED RELEASE ORAL at 08:59

## 2020-05-19 RX ADMIN — BUSPIRONE HYDROCHLORIDE 15 MG: 7.5 TABLET ORAL at 21:05

## 2020-05-19 RX ADMIN — LITHIUM CARBONATE 300 MG: 300 TABLET, FILM COATED, EXTENDED RELEASE ORAL at 21:05

## 2020-05-19 RX ADMIN — FERROUS SULFATE TAB 325 MG (65 MG ELEMENTAL FE) 325 MG: 325 (65 FE) TAB at 17:49

## 2020-05-19 RX ADMIN — NICOTINE POLACRILEX 4 MG: 4 GUM, CHEWING BUCCAL at 17:49

## 2020-05-19 RX ADMIN — FERROUS SULFATE TAB 325 MG (65 MG ELEMENTAL FE) 325 MG: 325 (65 FE) TAB at 08:59

## 2020-05-19 RX ADMIN — NICOTINE POLACRILEX 4 MG: 4 GUM, CHEWING BUCCAL at 15:11

## 2020-05-19 RX ADMIN — HYDROXYZINE PAMOATE 50 MG: 50 CAPSULE ORAL at 13:49

## 2020-05-19 RX ADMIN — BUSPIRONE HYDROCHLORIDE 10 MG: 10 TABLET ORAL at 08:59

## 2020-05-19 RX ADMIN — LITHIUM CARBONATE 300 MG: 300 TABLET, FILM COATED, EXTENDED RELEASE ORAL at 08:59

## 2020-05-19 RX ADMIN — NICOTINE POLACRILEX 4 MG: 4 GUM, CHEWING BUCCAL at 21:42

## 2020-05-19 RX ADMIN — HYDROXYZINE PAMOATE 50 MG: 50 CAPSULE ORAL at 21:05

## 2020-05-19 RX ADMIN — BUSPIRONE HYDROCHLORIDE 15 MG: 7.5 TABLET ORAL at 13:49

## 2020-05-19 RX ADMIN — CLONAZEPAM 1 MG: 1 TABLET ORAL at 12:23

## 2020-05-19 RX ADMIN — TRAZODONE HYDROCHLORIDE 50 MG: 50 TABLET ORAL at 21:05

## 2020-05-19 RX ADMIN — CLONAZEPAM 1 MG: 1 TABLET ORAL at 19:09

## 2020-05-19 NOTE — BH NOTE
Group Therapy Note    Date: 5/18/2020  Start Time: 20:00  End Time:  21:00  Number of Participants: 12    Type of Group: Recreational  Wrap up    Lázaro Larios Information  Module Name:  /  Session Number:  /    Patient's Goal:  Play piano    Notes:  Goal completed    Status After Intervention:  Unchanged    Participation Level:  Active Listener and Interactive    Participation Quality: Appropriate and Attentive      Speech:  pressured      Thought Process/Content: Logical      Affective Functioning: Blunted      Mood: anxious      Level of consciousness:  Alert, Oriented x4 and Attentive      Response to Learning: Able to change behavior      Endings: None Reported    Modes of Intervention: Socialization and Problem-solving      Discipline Responsible: Behavorial Health Tech      Signature:  Getachew Bonds

## 2020-05-19 NOTE — PROGRESS NOTES
Occupational Therapy    Attempted OT treatment this afternoon after chart review completed. Pt was found sleeping in her room. Initially responded to OT, but quickly fell back asleep. Will re-attempt treatment another day.     Keturah Hull [address] 87, OTR/L  #[id number]

## 2020-05-19 NOTE — BH NOTE
Pt approached writer with c/o increased anxiety, which coping mechanisms were ineffective. PRN Klonopin requested and given per order.

## 2020-05-20 VITALS
TEMPERATURE: 98.1 F | OXYGEN SATURATION: 97 % | BODY MASS INDEX: 36.37 KG/M2 | HEIGHT: 68 IN | RESPIRATION RATE: 16 BRPM | HEART RATE: 81 BPM | DIASTOLIC BLOOD PRESSURE: 84 MMHG | SYSTOLIC BLOOD PRESSURE: 152 MMHG | WEIGHT: 240 LBS

## 2020-05-20 PROCEDURE — 5130000000 HC BRIDGE APPOINTMENT

## 2020-05-20 PROCEDURE — 99239 HOSP IP/OBS DSCHRG MGMT >30: CPT | Performed by: PSYCHIATRY & NEUROLOGY

## 2020-05-20 PROCEDURE — 6370000000 HC RX 637 (ALT 250 FOR IP): Performed by: PSYCHIATRY & NEUROLOGY

## 2020-05-20 RX ORDER — CLONAZEPAM 1 MG/1
1 TABLET ORAL NIGHTLY PRN
Qty: 30 TABLET | Refills: 0 | Status: ON HOLD | OUTPATIENT
Start: 2020-05-20 | End: 2020-05-31 | Stop reason: SDUPTHER

## 2020-05-20 RX ORDER — BUSPIRONE HYDROCHLORIDE 15 MG/1
15 TABLET ORAL 3 TIMES DAILY
Qty: 90 TABLET | Refills: 0 | Status: ON HOLD | OUTPATIENT
Start: 2020-05-20 | End: 2020-05-31 | Stop reason: SDUPTHER

## 2020-05-20 RX ORDER — FERROUS SULFATE 325(65) MG
325 TABLET ORAL 2 TIMES DAILY WITH MEALS
Qty: 60 TABLET | Refills: 0 | Status: SHIPPED | OUTPATIENT
Start: 2020-05-20

## 2020-05-20 RX ORDER — TRAZODONE HYDROCHLORIDE 50 MG/1
50 TABLET ORAL NIGHTLY
Qty: 30 TABLET | Refills: 0 | Status: ON HOLD | OUTPATIENT
Start: 2020-05-20 | End: 2020-05-31 | Stop reason: HOSPADM

## 2020-05-20 RX ORDER — DULOXETIN HYDROCHLORIDE 30 MG/1
30 CAPSULE, DELAYED RELEASE ORAL 2 TIMES DAILY
Qty: 60 CAPSULE | Refills: 0 | Status: SHIPPED | OUTPATIENT
Start: 2020-05-20

## 2020-05-20 RX ORDER — LITHIUM CARBONATE 300 MG/1
300 TABLET, FILM COATED, EXTENDED RELEASE ORAL EVERY 12 HOURS SCHEDULED
Qty: 60 TABLET | Refills: 0 | Status: SHIPPED | OUTPATIENT
Start: 2020-05-20 | End: 2021-03-04

## 2020-05-20 RX ADMIN — LITHIUM CARBONATE 300 MG: 300 TABLET, FILM COATED, EXTENDED RELEASE ORAL at 08:48

## 2020-05-20 RX ADMIN — CLONAZEPAM 1 MG: 1 TABLET ORAL at 09:52

## 2020-05-20 RX ADMIN — DULOXETINE HYDROCHLORIDE 30 MG: 30 CAPSULE, DELAYED RELEASE ORAL at 08:48

## 2020-05-20 RX ADMIN — BUSPIRONE HYDROCHLORIDE 15 MG: 7.5 TABLET ORAL at 08:48

## 2020-05-20 RX ADMIN — HYDROXYZINE PAMOATE 50 MG: 50 CAPSULE ORAL at 05:13

## 2020-05-20 RX ADMIN — NICOTINE POLACRILEX 4 MG: 4 GUM, CHEWING BUCCAL at 09:30

## 2020-05-20 RX ADMIN — FERROUS SULFATE TAB 325 MG (65 MG ELEMENTAL FE) 325 MG: 325 (65 FE) TAB at 08:48

## 2020-05-20 NOTE — BH NOTE
Patient came to desk requesting medication for her increased anxiety. Patient states she is very anxious about discharge, feels that she ready but is nervous. Klonopin 1 mg given per order. Will continue to monitor.

## 2020-05-20 NOTE — BH NOTE
Reassessment of PRN Klonopin. Patient sitting calmly in group, easy respirations, no signs of distress. Medication effective.

## 2020-05-22 NOTE — DISCHARGE SUMMARY
hurt herself. She was trying to kill herself while at home with  her parents. She apparently talked with mom and then came to the ED,  but did not want to come to the hospital.  She stated that her parents  are generally supportive, but they yell frequently and does not feel  like they are sometime supportive in a way that is helpful. She  apparently talked with her psychiatrist, Dr. Lex persaudp to three  days ago and let him know that she is having suicidal ideation. She has  had recent medication changes as well. She has had problems with her  boyfriend, dad going to be deployed in two weeks, mother has lupus and  the patient works at [employer] for the last three weeks. She has had access  several times to guns that are in the home due to dad being in the  [employer], but apparently he had locked up the guns. She has also talked  about taking pills or crashing her car. She also has a history of  self-harm behaviors of biting her fingers until they bleed and stated  that she started seeing a psychiatrist by telling mother that she needed  one, which was seven months ago.     She states that she has had numerous threats to harm herself, but has  never made any attempts. She states she has been on and off suicidal  for five years. She stated that she spends her time smoking marijuana  and working at a new job at [employer]. She describes the medication is not  being overly helpful. She has been on Cymbalta for six months with  minimal effect, restarted lithium a week ago 600 mg daily and has been  on Abilify for four months without any improvement. She states she has  been diagnosed with a variety of things including bipolar affective  disorder, borderline personality, PTSD and OCD. Past meds include  Zoloft and Wellbutrin, which she did not find helpful. She states her  suicidality is still present, but less intense. She describes  nightmares one time a week.    [employer] has been active in [email or web address].org/      Hemoglobin A1C 05/17/2020 4.8  See comment % Final    Comment: Comment:  Diagnosis of Diabetes: > or = 6.5%  Increased risk of diabetes (Prediabetes): 5.7-6.4%  Glycemic Control: Nonpregnant Adults: <7.0%                    Pregnant: <6.0%        eAG 05/17/2020 91.1  mg/dL Final    Cholesterol, Total 05/17/2020 197  0 - 199 mg/dL Final    Triglycerides 05/17/2020 78  0 - 150 mg/dL Final    HDL 05/17/2020 87* 40 - 60 mg/dL Final    LDL Calculated 05/17/2020 94  <100 mg/dL Final    VLDL Cholesterol Calculated 05/17/2020 16  Not Established mg/dL Final    TSH 05/17/2020 1.12  0.27 - 4.20 uIU/mL Final        Mental Status Exam at Discharge:  Level of consciousness:  awake  Appearance:  well-appearing, in chair, good grooming and good hygiene well-developed, well-nourished  Behavior/Motor:  no abnormalities noted normal gait and station AIMS: 0  Attitude toward examiner:  cooperative, attentive and good eye contact  Speech:  spontaneous, normal rate, normal volume and well articulated  Mood:  dysthymic  Affect:  mood congruent Anxiety: mild  Hallucinations: Absent  Thought processes:  coherent Attention span, Concentration & Attention:  attention span and concentration were age appropriate  Thought content:   no evidence of delusions OCD: none    Insight: normal insight and judgment Cognition:  oriented to person, place, and time  Fund of Knowledge: average  IQ:average Memory: intact  Suicide:  No specific plan to harm self  Sleep: sleeps through the night  Appetite: ok   Reassess Belle Risk:  no specific plan to harm self Pt has phone numbers to contact if suicidal thoughts recur and states pt will return to the hospital if suicidal feelings return.       Discharge Meds:    Discharge Medication List as of 5/20/2020 12:16 PM           Details   busPIRone (BUSPAR) 15 MG tablet Take 15 mg by mouth 3 times daily, Disp-90 tablet, R-0Normal      clonazePAM (KLONOPIN) 1

## 2020-05-27 ENCOUNTER — HOSPITAL ENCOUNTER (INPATIENT)
Age: 20
LOS: 4 days | Discharge: HOME | DRG: 885 | End: 2020-05-31
Attending: PSYCHIATRY & NEUROLOGY | Admitting: PSYCHIATRY & NEUROLOGY
Payer: COMMERCIAL

## 2020-05-27 ENCOUNTER — HOSPITAL ENCOUNTER (EMERGENCY)
Age: 20
Discharge: ANOTHER HOSPITAL | End: 2020-05-27
Attending: EMERGENCY MEDICINE
Payer: COMMERCIAL

## 2020-05-27 VITALS
WEIGHT: 240 LBS | OXYGEN SATURATION: 97 % | RESPIRATION RATE: 16 BRPM | SYSTOLIC BLOOD PRESSURE: 117 MMHG | BODY MASS INDEX: 36.37 KG/M2 | DIASTOLIC BLOOD PRESSURE: 59 MMHG | HEART RATE: 89 BPM | TEMPERATURE: 98.6 F | HEIGHT: 68 IN

## 2020-05-27 LAB
A/G RATIO: 1.6 (ref 1.1–2.2)
ACETAMINOPHEN LEVEL: <5 UG/ML (ref 10–30)
ALBUMIN SERPL-MCNC: 4 G/DL (ref 3.4–5)
ALP BLD-CCNC: 58 U/L (ref 40–129)
ALT SERPL-CCNC: 10 U/L (ref 10–40)
AMPHETAMINE SCREEN, URINE: ABNORMAL
ANION GAP SERPL CALCULATED.3IONS-SCNC: 11 MMOL/L (ref 3–16)
AST SERPL-CCNC: 13 U/L (ref 15–37)
BARBITURATE SCREEN URINE: ABNORMAL
BASOPHILS ABSOLUTE: 0.1 K/UL (ref 0–0.2)
BASOPHILS RELATIVE PERCENT: 0.7 %
BENZODIAZEPINE SCREEN, URINE: ABNORMAL
BILIRUB SERPL-MCNC: <0.2 MG/DL (ref 0–1)
BUN BLDV-MCNC: 10 MG/DL (ref 7–20)
CALCIUM SERPL-MCNC: 9.2 MG/DL (ref 8.3–10.6)
CANNABINOID SCREEN URINE: POSITIVE
CHLORIDE BLD-SCNC: 101 MMOL/L (ref 99–110)
CO2: 27 MMOL/L (ref 21–32)
COCAINE METABOLITE SCREEN URINE: ABNORMAL
CREAT SERPL-MCNC: <0.5 MG/DL (ref 0.6–1.1)
EOSINOPHILS ABSOLUTE: 0.3 K/UL (ref 0–0.6)
EOSINOPHILS RELATIVE PERCENT: 2.2 %
ETHANOL: NORMAL MG/DL (ref 0–0.08)
GFR AFRICAN AMERICAN: >60
GFR NON-AFRICAN AMERICAN: >60
GLOBULIN: 2.5 G/DL
GLUCOSE BLD-MCNC: 92 MG/DL (ref 70–99)
HCG QUALITATIVE: NEGATIVE
HCT VFR BLD CALC: 40.9 % (ref 36–48)
HEMOGLOBIN: 13.5 G/DL (ref 12–16)
LITHIUM DOSE AMOUNT: ABNORMAL
LITHIUM LEVEL: <0.1 MMOL/L (ref 0.6–1.2)
LYMPHOCYTES ABSOLUTE: 4 K/UL (ref 1–5.1)
LYMPHOCYTES RELATIVE PERCENT: 31.1 %
Lab: ABNORMAL
MCH RBC QN AUTO: 29.1 PG (ref 26–34)
MCHC RBC AUTO-ENTMCNC: 33.1 G/DL (ref 31–36)
MCV RBC AUTO: 87.9 FL (ref 80–100)
METHADONE SCREEN, URINE: ABNORMAL
MONOCYTES ABSOLUTE: 0.6 K/UL (ref 0–1.3)
MONOCYTES RELATIVE PERCENT: 4.9 %
NEUTROPHILS ABSOLUTE: 7.8 K/UL (ref 1.7–7.7)
NEUTROPHILS RELATIVE PERCENT: 61.1 %
OPIATE SCREEN URINE: ABNORMAL
OXYCODONE URINE: ABNORMAL
PDW BLD-RTO: 13.4 % (ref 12.4–15.4)
PH UA: 6
PHENCYCLIDINE SCREEN URINE: ABNORMAL
PLATELET # BLD: 256 K/UL (ref 135–450)
PMV BLD AUTO: 8.1 FL (ref 5–10.5)
POTASSIUM REFLEX MAGNESIUM: 3.9 MMOL/L (ref 3.5–5.1)
PROPOXYPHENE SCREEN: ABNORMAL
RBC # BLD: 4.65 M/UL (ref 4–5.2)
SALICYLATE, SERUM: <0.3 MG/DL (ref 15–30)
SARS-COV-2, NAAT: NOT DETECTED
SODIUM BLD-SCNC: 139 MMOL/L (ref 136–145)
TOTAL PROTEIN: 6.5 G/DL (ref 6.4–8.2)
WBC # BLD: 12.8 K/UL (ref 4–11)

## 2020-05-27 PROCEDURE — 80307 DRUG TEST PRSMV CHEM ANLYZR: CPT

## 2020-05-27 PROCEDURE — 1240000000 HC EMOTIONAL WELLNESS R&B

## 2020-05-27 PROCEDURE — G0480 DRUG TEST DEF 1-7 CLASSES: HCPCS

## 2020-05-27 PROCEDURE — 80053 COMPREHEN METABOLIC PANEL: CPT

## 2020-05-27 PROCEDURE — 80178 ASSAY OF LITHIUM: CPT

## 2020-05-27 PROCEDURE — 99285 EMERGENCY DEPT VISIT HI MDM: CPT

## 2020-05-27 PROCEDURE — 6370000000 HC RX 637 (ALT 250 FOR IP): Performed by: EMERGENCY MEDICINE

## 2020-05-27 PROCEDURE — 6370000000 HC RX 637 (ALT 250 FOR IP): Performed by: PSYCHIATRY & NEUROLOGY

## 2020-05-27 PROCEDURE — 84703 CHORIONIC GONADOTROPIN ASSAY: CPT

## 2020-05-27 PROCEDURE — 85025 COMPLETE CBC W/AUTO DIFF WBC: CPT

## 2020-05-27 PROCEDURE — U0002 COVID-19 LAB TEST NON-CDC: HCPCS

## 2020-05-27 RX ORDER — FERROUS SULFATE 325(65) MG
325 TABLET ORAL ONCE
Status: DISCONTINUED | OUTPATIENT
Start: 2020-05-27 | End: 2020-05-27 | Stop reason: HOSPADM

## 2020-05-27 RX ORDER — CLONAZEPAM 1 MG/1
1 TABLET ORAL NIGHTLY PRN
Status: DISCONTINUED | OUTPATIENT
Start: 2020-05-27 | End: 2020-05-31 | Stop reason: HOSPADM

## 2020-05-27 RX ORDER — DULOXETIN HYDROCHLORIDE 30 MG/1
30 CAPSULE, DELAYED RELEASE ORAL ONCE
Status: COMPLETED | OUTPATIENT
Start: 2020-05-27 | End: 2020-05-27

## 2020-05-27 RX ORDER — NICOTINE 21 MG/24HR
1 PATCH, TRANSDERMAL 24 HOURS TRANSDERMAL DAILY
Status: DISCONTINUED | OUTPATIENT
Start: 2020-05-27 | End: 2020-05-27 | Stop reason: HOSPADM

## 2020-05-27 RX ORDER — LITHIUM CARBONATE 300 MG/1
300 TABLET, FILM COATED, EXTENDED RELEASE ORAL ONCE
Status: COMPLETED | OUTPATIENT
Start: 2020-05-27 | End: 2020-05-27

## 2020-05-27 RX ORDER — DULOXETIN HYDROCHLORIDE 30 MG/1
30 CAPSULE, DELAYED RELEASE ORAL 2 TIMES DAILY
Status: DISCONTINUED | OUTPATIENT
Start: 2020-05-27 | End: 2020-05-31 | Stop reason: HOSPADM

## 2020-05-27 RX ORDER — AMMONIA INHALANTS 0.04 G/.3ML
INHALANT RESPIRATORY (INHALATION)
Status: DISPENSED
Start: 2020-05-27 | End: 2020-05-27

## 2020-05-27 RX ORDER — BUSPIRONE HYDROCHLORIDE 7.5 MG/1
15 TABLET ORAL 3 TIMES DAILY
Status: DISCONTINUED | OUTPATIENT
Start: 2020-05-27 | End: 2020-05-31 | Stop reason: HOSPADM

## 2020-05-27 RX ORDER — FERROUS SULFATE 325(65) MG
325 TABLET ORAL 2 TIMES DAILY WITH MEALS
Status: DISCONTINUED | OUTPATIENT
Start: 2020-05-28 | End: 2020-05-31 | Stop reason: HOSPADM

## 2020-05-27 RX ORDER — LORAZEPAM 1 MG/1
1 TABLET ORAL ONCE
Status: COMPLETED | OUTPATIENT
Start: 2020-05-27 | End: 2020-05-27

## 2020-05-27 RX ORDER — LITHIUM CARBONATE 300 MG/1
300 TABLET, FILM COATED, EXTENDED RELEASE ORAL EVERY 12 HOURS SCHEDULED
Status: DISCONTINUED | OUTPATIENT
Start: 2020-05-27 | End: 2020-05-31 | Stop reason: HOSPADM

## 2020-05-27 RX ORDER — BUSPIRONE HYDROCHLORIDE 5 MG/1
15 TABLET ORAL ONCE
Status: COMPLETED | OUTPATIENT
Start: 2020-05-27 | End: 2020-05-27

## 2020-05-27 RX ORDER — TRAZODONE HYDROCHLORIDE 50 MG/1
50 TABLET ORAL NIGHTLY
Status: DISCONTINUED | OUTPATIENT
Start: 2020-05-27 | End: 2020-05-29

## 2020-05-27 RX ORDER — TRAZODONE HYDROCHLORIDE 50 MG/1
50 TABLET ORAL ONCE
Status: COMPLETED | OUTPATIENT
Start: 2020-05-28 | End: 2020-05-27

## 2020-05-27 RX ADMIN — TRAZODONE HYDROCHLORIDE 50 MG: 50 TABLET ORAL at 23:52

## 2020-05-27 RX ADMIN — BUSPIRONE HYDROCHLORIDE 15 MG: 5 TABLET ORAL at 14:38

## 2020-05-27 RX ADMIN — DULOXETINE HYDROCHLORIDE 30 MG: 30 CAPSULE, DELAYED RELEASE ORAL at 11:37

## 2020-05-27 RX ADMIN — BUSPIRONE HYDROCHLORIDE 15 MG: 5 TABLET ORAL at 11:38

## 2020-05-27 RX ADMIN — DULOXETINE HYDROCHLORIDE 30 MG: 30 CAPSULE, DELAYED RELEASE ORAL at 21:11

## 2020-05-27 RX ADMIN — LITHIUM CARBONATE 300 MG: 300 TABLET, FILM COATED, EXTENDED RELEASE ORAL at 11:37

## 2020-05-27 RX ADMIN — CLONAZEPAM 1 MG: 1 TABLET ORAL at 21:14

## 2020-05-27 RX ADMIN — TRAZODONE HYDROCHLORIDE 50 MG: 50 TABLET ORAL at 21:12

## 2020-05-27 RX ADMIN — LORAZEPAM 1 MG: 1 TABLET ORAL at 11:39

## 2020-05-27 RX ADMIN — LITHIUM CARBONATE 300 MG: 300 TABLET, FILM COATED, EXTENDED RELEASE ORAL at 21:12

## 2020-05-27 RX ADMIN — BUSPIRONE HYDROCHLORIDE 15 MG: 7.5 TABLET ORAL at 21:11

## 2020-05-27 ASSESSMENT — PAIN DESCRIPTION - FREQUENCY: FREQUENCY: CONTINUOUS

## 2020-05-27 ASSESSMENT — SLEEP AND FATIGUE QUESTIONNAIRES
DIFFICULTY FALLING ASLEEP: YES
SLEEP PATTERN: DIFFICULTY FALLING ASLEEP;DISTURBED/INTERRUPTED SLEEP
RESTFUL SLEEP: NO
DIFFICULTY ARISING: NO
DO YOU USE A SLEEP AID: YES
DO YOU HAVE DIFFICULTY SLEEPING: YES
AVERAGE NUMBER OF SLEEP HOURS: 4
DIFFICULTY STAYING ASLEEP: YES

## 2020-05-27 ASSESSMENT — PAIN DESCRIPTION - DESCRIPTORS: DESCRIPTORS: CONSTANT

## 2020-05-27 ASSESSMENT — ENCOUNTER SYMPTOMS
SHORTNESS OF BREATH: 0
ABDOMINAL PAIN: 0
VOMITING: 0
EYE DISCHARGE: 0

## 2020-05-27 ASSESSMENT — PAIN SCALES - GENERAL
PAINLEVEL_OUTOF10: 0
PAINLEVEL_OUTOF10: 9
PAINLEVEL_OUTOF10: 0

## 2020-05-27 ASSESSMENT — PATIENT HEALTH QUESTIONNAIRE - PHQ9: SUM OF ALL RESPONSES TO PHQ QUESTIONS 1-9: 23

## 2020-05-27 ASSESSMENT — PAIN DESCRIPTION - ORIENTATION: ORIENTATION: PROXIMAL

## 2020-05-27 ASSESSMENT — PAIN DESCRIPTION - PROGRESSION: CLINICAL_PROGRESSION: GRADUALLY WORSENING

## 2020-05-27 ASSESSMENT — PAIN DESCRIPTION - LOCATION: LOCATION: HEAD

## 2020-05-27 ASSESSMENT — PAIN DESCRIPTION - ONSET: ONSET: ON-GOING

## 2020-05-27 ASSESSMENT — PAIN DESCRIPTION - PAIN TYPE: TYPE: ACUTE PAIN

## 2020-05-27 NOTE — ED NOTES
@1426 spoke to [phone] [facility] transfer center re: update on pt placement. Stated at this time they had contacted [employer] who is out of network for [employer]  @1910 [place] contacted & packet faxed for review  @3314 spoke to [facility], UC = no beds available, Lori Webb will check with Anju Avila charge during bed huddle @7575 for any possible DC's  @1730 called for update with transfer center, Иван Goldman is still eval'ing, [facility] has 3 discharges on the board, waiting for pt p/u  @1936  called & spoke to [address] [place] [address], accepted pt for transfer to ED.  [facility] requesting bed & will arrange transportation     [facility]  05/27/20 [facility]  05/27/20 [facility]  05/27/20 8867

## 2020-05-27 NOTE — ED NOTES
Report called to [facility] in [facility].  Transport left with pt.     Daron Duverney, RN  05/27/20 5656

## 2020-05-27 NOTE — ED NOTES
Level of Care Disposition: admit/transfer       Patient was seen by ED provider and [facility] staff. The case presented to psychiatric provider on-call [address]. Based on the ED evaluation and information presented to the provider by Denilson Sherman it was determined that inpatient hospitalization is the least restrictive environment for the patient at this time. RATIONALE FOR ADMISSION:     Patient at imminent risk of danger to self as demonstrated by suicidal attempt and suicidal ideations with plan and intent to over dose on her medications.            Insurance Pre certification Authorization: Concepcion Best MSW,LSW

## 2020-05-27 NOTE — ED PROVIDER NOTES
medications based on medication reconciliation while she was waiting to be transferred. Vitals were stable at time of transfer.        Hitesh Robert MD  05/27/20 6708
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
## 2020-05-27 NOTE — ED PROVIDER NOTES
Emergency Department Provider Note  Location: [address] [place] [address]  ED  5/27/2020     Patient Identification  Tiffanie Herbert is a 21 y.o. female    Chief Complaint  Suicidal (patient reports \"i wrapped a cord around my neck\" and \"I want to die\" patient states that still having a lot of friends issues and boyfriend problems, patient has flat affect throughout interview)      Mode of Arrival  private car    HPI  (History provided by patient)  This is a 21 y.o. female with a PMH significant for depression, anxiety presented today for suicide ideation. Patient states she was recently admitted for anxiety and depression. She states that anxiety has gotten slightly better but her depression is still severe. She started having suicidal ideation again. She states her parents took weapons away and there is no longer any guns in the house. However, she thought about taking all her pills at once. She also thought about wrapping a cord around her neck. She states she still having a lot of issues with her boyfriend and that is contributing to her depression. She states she took 2 Klonopin and had a beer earlier tonight. Denies other ingestion at this time. ROS  Review of Systems   Constitutional: Negative for fever. Eyes: Negative for discharge. Respiratory: Negative for shortness of breath. Cardiovascular: Negative for chest pain. Gastrointestinal: Negative for abdominal pain and vomiting. Genitourinary: Negative for dysuria and frequency. Skin: Negative for rash. Neurological: Negative for syncope and speech difficulty. Psychiatric/Behavioral: Positive for suicidal ideas. Negative for confusion and self-injury. The patient is nervous/anxious. I have reviewed the following nursing documentation:  Allergies:    Allergies   Allergen Reactions    Latex Hives    Benadryl [Diphenhydramine]     Morphine Anxiety       Past medical history:  has a past medical history of Depression and Lissa-Danlos syndrome. Past surgical history:  has a past surgical history that includes Breast reduction surgery (Bilateral, 2017) and Breast reduction surgery. Home medications:   Prior to Admission medications    Medication Sig Start Date End Date Taking? Authorizing Provider   busPIRone (BUSPAR) 15 MG tablet Take 15 mg by mouth 3 times daily 5/20/20   Mino Contreras MD   clonazePAM (KLONOPIN) 1 MG tablet Take 1 tablet by mouth nightly as needed for Anxiety for up to 30 days. 5/20/20 6/19/20  Mino Contreras MD   DULoxetine (CYMBALTA) 30 MG extended release capsule Take 1 capsule by mouth 2 times daily 5/20/20   Mino Contreras MD   traZODone (DESYREL) 50 MG tablet Take 1 tablet by mouth nightly 5/20/20   Mino Contreras MD   ferrous sulfate (IRON 325) 325 (65 Fe) MG tablet Take 1 tablet by mouth 2 times daily (with meals) 5/20/20   Mino Contreras MD   lithium (LITHOBID) 300 MG extended release tablet Take 1 tablet by mouth every 12 hours 5/20/20   Mino Contreras MD   norgestimate-ethinyl estradiol ([facility]) 0.25-35 MG-MCG per tablet Take by mouth 1/22/18   Historical Provider, MD       Social history:  reports that she has never smoked. She has never used smokeless tobacco. She reports current drug use. Drug: Marijuana. She reports that she does not drink alcohol. Family history:    Family History   Problem Relation Age of Onset    Mental Illness Mother     Other Mother     Mental Illness Maternal Cousin        Exam  ED Triage Vitals [05/27/20 0136]   BP Temp Temp Source Pulse Resp SpO2 Height Weight   117/74 97.5 °F (36.4 °C) Oral 87 16 98 % 5' 8\" (1.727 m) 240 lb (108.9 kg)   Physical Exam  Vitals signs and nursing note reviewed. Constitutional:       General: She is not in acute distress. Appearance: Normal appearance. She is well-developed. She is not diaphoretic. HENT:      Head: Normocephalic and atraumatic.    Eyes:      General: Lids are normal. No scleral

## 2020-05-28 PROCEDURE — 97165 OT EVAL LOW COMPLEX 30 MIN: CPT

## 2020-05-28 PROCEDURE — 6370000000 HC RX 637 (ALT 250 FOR IP): Performed by: PSYCHIATRY & NEUROLOGY

## 2020-05-28 PROCEDURE — 99223 1ST HOSP IP/OBS HIGH 75: CPT | Performed by: PSYCHIATRY & NEUROLOGY

## 2020-05-28 PROCEDURE — 1240000000 HC EMOTIONAL WELLNESS R&B

## 2020-05-28 PROCEDURE — 97535 SELF CARE MNGMENT TRAINING: CPT

## 2020-05-28 RX ORDER — NICOTINE 21 MG/24HR
1 PATCH, TRANSDERMAL 24 HOURS TRANSDERMAL DAILY
Status: DISCONTINUED | OUTPATIENT
Start: 2020-05-28 | End: 2020-05-31 | Stop reason: HOSPADM

## 2020-05-28 RX ADMIN — DULOXETINE HYDROCHLORIDE 30 MG: 30 CAPSULE, DELAYED RELEASE ORAL at 21:08

## 2020-05-28 RX ADMIN — LITHIUM CARBONATE 300 MG: 300 TABLET, FILM COATED, EXTENDED RELEASE ORAL at 09:12

## 2020-05-28 RX ADMIN — TRAZODONE HYDROCHLORIDE 50 MG: 50 TABLET ORAL at 23:35

## 2020-05-28 RX ADMIN — FERROUS SULFATE TAB 325 MG (65 MG ELEMENTAL FE) 325 MG: 325 (65 FE) TAB at 09:12

## 2020-05-28 RX ADMIN — BUSPIRONE HYDROCHLORIDE 15 MG: 7.5 TABLET ORAL at 09:12

## 2020-05-28 RX ADMIN — LITHIUM CARBONATE 300 MG: 300 TABLET, FILM COATED, EXTENDED RELEASE ORAL at 21:08

## 2020-05-28 RX ADMIN — CLONAZEPAM 1 MG: 1 TABLET ORAL at 22:33

## 2020-05-28 RX ADMIN — FERROUS SULFATE TAB 325 MG (65 MG ELEMENTAL FE) 325 MG: 325 (65 FE) TAB at 18:20

## 2020-05-28 RX ADMIN — DULOXETINE HYDROCHLORIDE 30 MG: 30 CAPSULE, DELAYED RELEASE ORAL at 09:12

## 2020-05-28 RX ADMIN — BUSPIRONE HYDROCHLORIDE 15 MG: 7.5 TABLET ORAL at 14:18

## 2020-05-28 RX ADMIN — BUSPIRONE HYDROCHLORIDE 15 MG: 7.5 TABLET ORAL at 21:07

## 2020-05-28 ASSESSMENT — PAIN SCALES - GENERAL
PAINLEVEL_OUTOF10: 0

## 2020-05-28 ASSESSMENT — LIFESTYLE VARIABLES: HISTORY_ALCOHOL_USE: NO

## 2020-05-28 NOTE — GROUP NOTE
Group Therapy Note    Date: 5/28/2020    Group Start Time: 0945  Group End Time: 1050  Group Topic: Psychoeducation    [address] [facility]        Group Therapy Note    Attendees: 11         Patient's Goal:  Patients were invited to participate in an Art Therapy / Psycho-Education group using a narrative therapy activity. Patients were encouraged to reflect upon a time in their past when they overcame an obstacle or a challenge and to reflect on that time using an art material of their choosing. Towards the end of session, patients were encouraged to share their art and to reflect on the personal strengths and values they used to overcome previous challenges and how they could apply them today. Notes:  Eulogio participated in art therapy, engaged in appropriate conversation with peers, and processed artwork with the group. Status After Intervention:  Improved    Participation Level:  Active Listener and Interactive    Participation Quality: Appropriate, Attentive, Sharing and Supportive      Speech:  normal      Thought Process/Content: Logical      Affective Functioning: Congruent      Mood: anxious      Level of consciousness:  Alert, Oriented x4 and Attentive      Response to Learning: Able to verbalize current knowledge/experience, Able to verbalize/acknowledge new learning, Able to retain information and Capable of insight      Endings: None Reported    Modes of Intervention: Education, Support, Socialization, Exploration, Activity and Media      Discipline Responsible: Psychoeducational Specialist      Signature:  Ludin Ojeda, [address] [facility]

## 2020-05-28 NOTE — PROGRESS NOTES
`[facility] [place]  Admission Note     Admission Type:   Admission Type: Voluntary    Reason for admission:  Reason for Admission: suicide attept    PATIENT STRENGTHS:  Strengths: Communication, No significant Physical Illness    Patient Strengths and Limitations:  Limitations: Unrealistic self-view, External locus of control    Addictive Behavior:   Addictive Behavior  In the past 3 months, have you felt or has someone told you that you have a problem with:  : Eating (too much/too little)  Do you have a history of Chemical Use?: No  Do you have a history of Alcohol Use?: No  Do you have a history of Street Drug Abuse?: No  Histroy of Prescripton Drug Abuse?: No    Medical Problems:   Past Medical History:   Diagnosis Date    Depression     Lissa-Danlos syndrome        Status EXAM:  Status and Exam  Normal: No  Facial Expression: Sad, Worried  Affect: Congruent  Level of Consciousness: Alert  Mood:Normal: No  Mood: Anxious, Depressed  Motor Activity:Normal: No  Motor Activity: Decreased  Interview Behavior: Cooperative  Preception: Quentin to Person, Tellis Faes to Time, Quentin to Situation, Quentin to Place  Attention:Normal: No  Attention: Distractible  Thought Processes: Circumstantial  Thought Content:Normal: No  Thought Content: Preoccupations  Hallucinations: None  Delusions: No  Memory:Normal: No  Memory: Poor Recent  Insight and Judgment: No  Insight and Judgment: Poor Judgment, Poor Insight  Present Suicidal Ideation: No  Present Homicidal Ideation: No    Tobacco Screening:  Practical Counseling, on admission, christopher X, if applicable and completed (first 3 are required if patient doesn't refuse):            ( )  Recognizing danger situations (included triggers and roadblocks)                    ( )  Coping skills (new ways to manage stress, exercise, relaxation techniques, changing routine, distraction)                                                           ( )  Basic information about quitting
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
## 2020-05-28 NOTE — PROGRESS NOTES
Patient is less anxious than before she took the klonopin. Medication is noted to be effective Patient stated that although she took the trazodone for sleep. She doesn't feel sleepy in the least. Patient said that she had drank warm milk and was completing a couple crossword games prior. Patient asked that the doctor be called to help her with sleep.

## 2020-05-28 NOTE — PROGRESS NOTES
Inpatient Occupational Therapy  Evaluation and Treatment    Unit:  [facility]  Date:  5/28/2020  Patient Name:    Bridget Pereyra  Admitting diagnosis:  Borderline personality disorder [facility]) [F60.3]  Admit Date:  5/27/2020  Precautions/Restrictions/WB Status/ Lines/ Wounds/ Oxygen:  Up as tolerated  Treatment Time:  11:08-11:34  Treatment Number:  1    Patient Goals for Therapy:  \" Learn how to cope with my depression. \"      Discharge Recommendations:   [x] Home with assist prn    DME needs for discharge:   NT     AM-PAC Score: 24     Home Health S4 Level: [x] NA   [] Level 1- Standard  []  Level 2- Social  [] Level 3- Safety  []  Level 4- Sick    ACLS:  TBA      Preadmission Environment:    Pt. Lives   [] alone  [x]with parents and 25 yr old brother  Home environment:   []Apartment   []one story  [x]three story home. Preadmission Status / PLOF:  History of falls   []Yes  [x]No  Pt. Able to drive   [x]Yes  []No   Pt Fully independent for ADLs/IADLs. [x]Yes  []No    Pt. Required assistance from family for:  []Bathing []Dressing []Cooking []Cleaning  []Laundry  []Other :   Pt. Fully independent for transfers and gait and walked with: [x]No Device  []Walker   []Cane  Sleep Hygiene:  7 hours sleep; fragmented sleep  Income:  Full time employed at [employer]:   Self; Pt. Reports difficulty with managing finances. Leisure Interests:  Smoking marijuana, play with cats, watch You Tube  Medication Management: self; Pt. Reports running out of medications prior to refill. Health Management:  Pt. Reports that she has a PCP, Psychiatrist, and therapist.  Pt. Reports that she has no difficulty maintaining appointments. Social Network:   Mother, Dad, 2 close friends  Stressors:  Work, relationships, family  Coping Skills: Smoke Marijuana, vape, color, write in journal, being with cats, use a planner      Pain  [x]Yes  []No  Rating:  3:10  Location:  Generalized joint pain   Pain Medicine Status: [] Denies need

## 2020-05-28 NOTE — PROGRESS NOTES
`[facility]  Admission Note     Admission Type:   Admission Type: Voluntary    Reason for admission:  Reason for Admission: suicide attept    PATIENT STRENGTHS:  Strengths: Communication, No significant Physical Illness    Patient Strengths and Limitations:  Limitations: Unrealistic self-view, External locus of control    Addictive Behavior:   Addictive Behavior  In the past 3 months, have you felt or has someone told you that you have a problem with:  : Eating (too much/too little)  Do you have a history of Chemical Use?: No  Do you have a history of Alcohol Use?: No  Do you have a history of Street Drug Abuse?: No  Histroy of Prescripton Drug Abuse?: No    Medical Problems:   Past Medical History:   Diagnosis Date    Depression     Lissa-Danlos syndrome        Status EXAM:  Status and Exam  Normal: No  Facial Expression: Sad, Worried  Affect: Congruent  Level of Consciousness: Alert  Mood:Normal: No  Mood: Anxious, Depressed  Motor Activity:Normal: No  Motor Activity: Decreased  Interview Behavior: Cooperative  Preception: [place] to Person, Carlyon Rile to Time, [place] to Situation, [place] to Place  Attention:Normal: No  Attention: Distractible  Thought Processes: Circumstantial  Thought Content:Normal: No  Thought Content: Preoccupations  Hallucinations: None  Delusions: No  Memory:Normal: No  Memory: Poor Recent  Insight and Judgment: No  Insight and Judgment: Poor Judgment, Poor Insight  Present Suicidal Ideation: No  Present Homicidal Ideation: No    Tobacco Screening:  Practical Counseling, on admission, christopher X, if applicable and completed (first 3 are required if patient doesn't refuse):            ( )  Recognizing danger situations (included triggers and roadblocks)                    ( )  Coping skills (new ways to manage stress, exercise, relaxation techniques, changing routine, distraction)                                                           ( )  Basic information about quitting

## 2020-05-28 NOTE — H&P
Patient was initially in group and when I went to re-evaluate the patient, she was on a phone call. I spoke with nursing staff who has no acute medical concerns at this time and states patient would likely do better if I allow the phone call and perform H&P tomorrow.      Miguelito Ramon PA-C  5/28/2020 2:56 PM

## 2020-05-29 PROCEDURE — 6370000000 HC RX 637 (ALT 250 FOR IP): Performed by: PHYSICIAN ASSISTANT

## 2020-05-29 PROCEDURE — 99222 1ST HOSP IP/OBS MODERATE 55: CPT | Performed by: PHYSICIAN ASSISTANT

## 2020-05-29 PROCEDURE — 99233 SBSQ HOSP IP/OBS HIGH 50: CPT | Performed by: PSYCHIATRY & NEUROLOGY

## 2020-05-29 PROCEDURE — 6370000000 HC RX 637 (ALT 250 FOR IP): Performed by: PSYCHIATRY & NEUROLOGY

## 2020-05-29 PROCEDURE — 1240000000 HC EMOTIONAL WELLNESS R&B

## 2020-05-29 RX ORDER — HYDROXYZINE HYDROCHLORIDE 10 MG/1
10 TABLET, FILM COATED ORAL 2 TIMES DAILY PRN
Status: DISCONTINUED | OUTPATIENT
Start: 2020-05-29 | End: 2020-05-31 | Stop reason: HOSPADM

## 2020-05-29 RX ORDER — TRAZODONE HYDROCHLORIDE 100 MG/1
100 TABLET ORAL NIGHTLY
Status: DISCONTINUED | OUTPATIENT
Start: 2020-05-29 | End: 2020-05-31 | Stop reason: HOSPADM

## 2020-05-29 RX ADMIN — FERROUS SULFATE TAB 325 MG (65 MG ELEMENTAL FE) 325 MG: 325 (65 FE) TAB at 09:26

## 2020-05-29 RX ADMIN — TRAZODONE HYDROCHLORIDE 100 MG: 100 TABLET ORAL at 21:43

## 2020-05-29 RX ADMIN — FERROUS SULFATE TAB 325 MG (65 MG ELEMENTAL FE) 325 MG: 325 (65 FE) TAB at 17:13

## 2020-05-29 RX ADMIN — NICOTINE POLACRILEX 4 MG: 2 GUM, CHEWING BUCCAL at 13:05

## 2020-05-29 RX ADMIN — DULOXETINE HYDROCHLORIDE 30 MG: 30 CAPSULE, DELAYED RELEASE ORAL at 09:26

## 2020-05-29 RX ADMIN — NICOTINE POLACRILEX 4 MG: 2 GUM, CHEWING BUCCAL at 22:00

## 2020-05-29 RX ADMIN — LITHIUM CARBONATE 300 MG: 300 TABLET, FILM COATED, EXTENDED RELEASE ORAL at 21:44

## 2020-05-29 RX ADMIN — CLONAZEPAM 1 MG: 1 TABLET ORAL at 22:47

## 2020-05-29 RX ADMIN — BUSPIRONE HYDROCHLORIDE 15 MG: 7.5 TABLET ORAL at 14:25

## 2020-05-29 RX ADMIN — BUSPIRONE HYDROCHLORIDE 15 MG: 7.5 TABLET ORAL at 21:44

## 2020-05-29 RX ADMIN — NICOTINE POLACRILEX 4 MG: 2 GUM, CHEWING BUCCAL at 11:19

## 2020-05-29 RX ADMIN — DULOXETINE HYDROCHLORIDE 30 MG: 30 CAPSULE, DELAYED RELEASE ORAL at 21:44

## 2020-05-29 RX ADMIN — BUSPIRONE HYDROCHLORIDE 15 MG: 7.5 TABLET ORAL at 09:26

## 2020-05-29 RX ADMIN — NICOTINE POLACRILEX 4 MG: 2 GUM, CHEWING BUCCAL at 17:49

## 2020-05-29 RX ADMIN — LITHIUM CARBONATE 300 MG: 300 TABLET, FILM COATED, EXTENDED RELEASE ORAL at 09:26

## 2020-05-29 RX ADMIN — HYDROXYZINE HYDROCHLORIDE 10 MG: 10 TABLET, FILM COATED ORAL at 19:23

## 2020-05-29 ASSESSMENT — PAIN SCALES - GENERAL
PAINLEVEL_OUTOF10: 0

## 2020-05-30 PROCEDURE — 6370000000 HC RX 637 (ALT 250 FOR IP): Performed by: PHYSICIAN ASSISTANT

## 2020-05-30 PROCEDURE — 99233 SBSQ HOSP IP/OBS HIGH 50: CPT | Performed by: NURSE PRACTITIONER

## 2020-05-30 PROCEDURE — 6370000000 HC RX 637 (ALT 250 FOR IP): Performed by: NURSE PRACTITIONER

## 2020-05-30 PROCEDURE — 1240000000 HC EMOTIONAL WELLNESS R&B

## 2020-05-30 PROCEDURE — 6370000000 HC RX 637 (ALT 250 FOR IP): Performed by: PSYCHIATRY & NEUROLOGY

## 2020-05-30 RX ORDER — ACETAMINOPHEN 325 MG/1
650 TABLET ORAL EVERY 4 HOURS PRN
Status: DISCONTINUED | OUTPATIENT
Start: 2020-05-30 | End: 2020-05-31 | Stop reason: HOSPADM

## 2020-05-30 RX ADMIN — FERROUS SULFATE TAB 325 MG (65 MG ELEMENTAL FE) 325 MG: 325 (65 FE) TAB at 07:58

## 2020-05-30 RX ADMIN — NICOTINE POLACRILEX 4 MG: 2 GUM, CHEWING BUCCAL at 16:30

## 2020-05-30 RX ADMIN — BUSPIRONE HYDROCHLORIDE 15 MG: 7.5 TABLET ORAL at 13:50

## 2020-05-30 RX ADMIN — TRAZODONE HYDROCHLORIDE 100 MG: 100 TABLET ORAL at 20:26

## 2020-05-30 RX ADMIN — NICOTINE POLACRILEX 4 MG: 2 GUM, CHEWING BUCCAL at 23:16

## 2020-05-30 RX ADMIN — CLONAZEPAM 1 MG: 1 TABLET ORAL at 21:15

## 2020-05-30 RX ADMIN — FERROUS SULFATE TAB 325 MG (65 MG ELEMENTAL FE) 325 MG: 325 (65 FE) TAB at 16:30

## 2020-05-30 RX ADMIN — HYDROXYZINE HYDROCHLORIDE 10 MG: 10 TABLET, FILM COATED ORAL at 09:05

## 2020-05-30 RX ADMIN — ACETAMINOPHEN 650 MG: 325 TABLET ORAL at 13:50

## 2020-05-30 RX ADMIN — NICOTINE POLACRILEX 4 MG: 2 GUM, CHEWING BUCCAL at 09:05

## 2020-05-30 RX ADMIN — DULOXETINE HYDROCHLORIDE 30 MG: 30 CAPSULE, DELAYED RELEASE ORAL at 07:59

## 2020-05-30 RX ADMIN — LITHIUM CARBONATE 300 MG: 300 TABLET, FILM COATED, EXTENDED RELEASE ORAL at 20:26

## 2020-05-30 RX ADMIN — DULOXETINE HYDROCHLORIDE 30 MG: 30 CAPSULE, DELAYED RELEASE ORAL at 20:26

## 2020-05-30 RX ADMIN — LITHIUM CARBONATE 300 MG: 300 TABLET, FILM COATED, EXTENDED RELEASE ORAL at 07:58

## 2020-05-30 RX ADMIN — HYDROXYZINE HYDROCHLORIDE 10 MG: 10 TABLET, FILM COATED ORAL at 01:07

## 2020-05-30 RX ADMIN — BUSPIRONE HYDROCHLORIDE 15 MG: 7.5 TABLET ORAL at 07:58

## 2020-05-30 RX ADMIN — BUSPIRONE HYDROCHLORIDE 15 MG: 7.5 TABLET ORAL at 20:26

## 2020-05-30 ASSESSMENT — PAIN SCALES - GENERAL: PAINLEVEL_OUTOF10: 6

## 2020-05-30 NOTE — PLAN OF CARE
Problem: Depressive Behavior With or Without Suicide Precautions:  Goal: Ability to disclose and discuss suicidal ideas will improve  Description: Ability to disclose and discuss suicidal ideas will improve  5/30/2020 0910 by Prerna Heard RN  Outcome: Ongoing     Problem: Depressive Behavior With or Without Suicide Precautions:  Goal: Absence of self-harm  Description: Absence of self-harm  Outcome: Ongoing   Pt slightly tearful. Depressed and s/w anxious. She did not seem to be able to really identify the issues. She did say she didn't want to go back to her difficult/lousy life. Briefly discussed schooling, job changes, etc but she did not seem too invested in the conversation. She did seem anxious and depressed. She did contract for safety. Rated her depression at an 8 and her anxiety a 2. She took all her hs meds, tried to sleep but seemed restless.   Given Klonopin 1 mg po at 2247 with good results of becoming calmer and being able to rest.

## 2020-05-30 NOTE — PLAN OF CARE
Problem: Depressive Behavior With or Without Suicide Precautions:  Goal: Ability to disclose and discuss suicidal ideas will improve  Description: Ability to disclose and discuss suicidal ideas will improve  Outcome: Ongoing  Met with client. Pleasant. Reports sleeping good. Reports feeling better. Reports SI thoughts last night. Client any SI thoughts currently. Client is able to contract for safety. Medication compliant.  Will continue to assess

## 2020-05-31 VITALS
DIASTOLIC BLOOD PRESSURE: 77 MMHG | SYSTOLIC BLOOD PRESSURE: 132 MMHG | BODY MASS INDEX: 36.37 KG/M2 | HEIGHT: 68 IN | HEART RATE: 85 BPM | OXYGEN SATURATION: 99 % | RESPIRATION RATE: 16 BRPM | WEIGHT: 240 LBS | TEMPERATURE: 97.6 F

## 2020-05-31 PROCEDURE — 99239 HOSP IP/OBS DSCHRG MGMT >30: CPT | Performed by: PSYCHIATRY & NEUROLOGY

## 2020-05-31 PROCEDURE — 6370000000 HC RX 637 (ALT 250 FOR IP): Performed by: PSYCHIATRY & NEUROLOGY

## 2020-05-31 PROCEDURE — 5130000000 HC BRIDGE APPOINTMENT

## 2020-05-31 RX ORDER — CLONAZEPAM 1 MG/1
1 TABLET ORAL NIGHTLY PRN
Qty: 30 TABLET | Refills: 0 | Status: SHIPPED | OUTPATIENT
Start: 2020-05-31 | End: 2021-09-15 | Stop reason: ALTCHOICE

## 2020-05-31 RX ORDER — LITHIUM CARBONATE 300 MG/1
300 TABLET, FILM COATED, EXTENDED RELEASE ORAL EVERY 12 HOURS SCHEDULED
Qty: 60 TABLET | Refills: 0 | Status: SHIPPED | OUTPATIENT
Start: 2020-05-31 | End: 2021-03-04

## 2020-05-31 RX ORDER — BUSPIRONE HYDROCHLORIDE 15 MG/1
15 TABLET ORAL 3 TIMES DAILY
Qty: 90 TABLET | Refills: 0 | Status: SHIPPED | OUTPATIENT
Start: 2020-05-31

## 2020-05-31 RX ORDER — DULOXETIN HYDROCHLORIDE 30 MG/1
30 CAPSULE, DELAYED RELEASE ORAL 2 TIMES DAILY
Qty: 60 CAPSULE | Refills: 0 | Status: SHIPPED | OUTPATIENT
Start: 2020-05-31 | End: 2021-09-15

## 2020-05-31 RX ORDER — TRAZODONE HYDROCHLORIDE 100 MG/1
100 TABLET ORAL NIGHTLY
Qty: 30 TABLET | Refills: 0 | Status: SHIPPED | OUTPATIENT
Start: 2020-05-31 | End: 2021-03-04

## 2020-05-31 RX ADMIN — DULOXETINE HYDROCHLORIDE 30 MG: 30 CAPSULE, DELAYED RELEASE ORAL at 07:59

## 2020-05-31 RX ADMIN — FERROUS SULFATE TAB 325 MG (65 MG ELEMENTAL FE) 325 MG: 325 (65 FE) TAB at 07:59

## 2020-05-31 RX ADMIN — NICOTINE POLACRILEX 4 MG: 2 GUM, CHEWING BUCCAL at 07:59

## 2020-05-31 RX ADMIN — LITHIUM CARBONATE 300 MG: 300 TABLET, FILM COATED, EXTENDED RELEASE ORAL at 07:59

## 2020-05-31 RX ADMIN — BUSPIRONE HYDROCHLORIDE 15 MG: 7.5 TABLET ORAL at 07:58

## 2020-05-31 NOTE — PLAN OF CARE
Patient was out with patient group on the large side, early in the shift & was social. Patient denied feelings of self harm. Patient then stated not feeling any better since admission. Patient then stated feeling a little better. \"My suicidal ideation comes & goes. \" Patient later come to the team station & stated that she wanted to see the doctor in the morning. \"I want to be discharged. My father is being deployed, Monday. \" Gurwinder Guerrero R.N.

## 2020-05-31 NOTE — BH NOTE
Client has been at the desk requesting PRN klonopin. Informed client that she only had klonopin ordered at bedtime. Client was given nicotine gum.  Hanging out with peers on the unit
During the Q 15 minutes rounds observed Pt in room visibly upset. Stopped and ask Pt what was going on and she felt overwhelmed and did not feel safe. Told Pt to wait in room with door open and that I would return. Returned to Pt's room 5-6 minutes after initial interaction and sat and spoke with Pt for 15-20 minutes. Pt expressed that she felt unsafe by herself and that she \" felt overwhelmed\". Talked to patient until she was calm and then she came back to the large side of the unit with the other pt's. Pt stated \" I feel much better. Thank you. \" will continue to monitor.
Group Therapy Note    Date: 5/30/2020  Start Time: 20:00  End Time:  21:00  Number of Participants: 17    Type of Group: Recreational  Wrap up    Lázaro Larios Information  Module Name:  /  Session Number:  /    Patient's Goal:  Better coping skills    Notes:  Continuing to work on goal    Status After Intervention:  Unchanged    Participation Level:  Active Listener and Interactive    Participation Quality: Appropriate and Attentive      Speech:  normal      Thought Process/Content: Logical      Affective Functioning: Blunted      Mood: stable      Level of consciousness:  Alert, Oriented x4 and Attentive      Response to Learning: Able to change behavior      Endings: None Reported    Modes of Intervention: Socialization and Problem-solving      Discipline Responsible: Behavorial Health Tech      Signature:  Linh Hernandez
Patient says she is suicidal but contracts for safety. Patient here for depression but quickly joined group of people playing games and laughing out on unit. Dr. Esther Salazar okay to discontinue suicide precautions. Dr. Esther Salazar okay to restart medication that patient discharged with last week. Klonopin being prn at nighttime. Will continue to monitor.
Therapist supervised Pt on a Facetime call.
Currently in Pain: No  Daily Nutrition (WDL): Within Defined Limits    Patient Monitoring:  Frequency of Checks: 4 times per hour, close    Psychiatric Symptoms:   Depression Symptoms  Depression Symptoms: Crying, Change in energy level, Feelings of helplessness  Anxiety Symptoms  Anxiety Symptoms: Generalized  Blank Symptoms  Blank Symptoms: Labile, Poor judgment     Psychosis Symptoms  Delusion Type: No problems reported or observed. Suicide Risk CSSR-S:  1) Within the past month, have you wished you were dead or wished you could go to sleep and not wake up? : Yes  2) Have you actually had any thoughts of killing yourself? : Yes  3) Have you been thinking about how you might kill yourself? : Yes  5) Have you started to work out or worked out the details of how to kill yourself? Do you intend to carry out this plan? : Yes  6) Have you ever done anything, started to do anything, or prepared to do anything to end your life?: Yes  Change in Result No Change in Plan of care NO      EDUCATION:   Learner Progress Toward Treatment Goals: Reviewed results and recommendations of this team    Method: Individual    Outcome: Verbalized understanding    PATIENT GOALS: \" to work on anxiety. \"     PLAN/TREATMENT RECOMMENDATIONS UPDATE: maintain safety, medication management     GOALS UPDATE:   Time frame for Short-Term Goals:  By time of discharge       Stephen Perez RN

## 2020-05-31 NOTE — PLAN OF CARE
Problem: Depressive Behavior With or Without Suicide Precautions:  Goal: Ability to disclose and discuss suicidal ideas will improve  Description: Ability to disclose and discuss suicidal ideas will improve  Outcome: Ongoing  Met with patient. Reports sleeping well. Patient is hoping for discharge, \" I need to say goodbye to my dad before he leaves for [place]. \" client reports feeling positive today and is future focused. Reports plan to return to [facility] for the next 2 weeks. Reports starting  new job at [employer], \" I love serving, its my passion. \"   Reports plans to establish support and face timing once a week with peers. Reports seeing a therapist and her next appointment is June 4th with Omar Vitale. Client denies SI/HI. Denies all thoughts of self harm. Affect is bright.

## 2020-06-02 NOTE — DISCHARGE SUMMARY
Discharge Summary   Admit Date: 5/27/2020   Discharge Date:  5/31/2020    Condition at DC stable   Spent over 40 minutes with patient and staff on [address] [facility]   Final Dx: axis I: Major Depression Severe, nonpsychotic recurrent PTSD. 3.  Cannabis abuse. Axis 2: Borderline Personality  Disorder  [place] 3: See Medical History    And Present on Admission:   Borderline personality disorder ([facility] [address]   Cannabis abuse   Lissa-Danlos syndrome   PTSD (post-traumatic stress disorder)   Severe episode of recurrent major depressive disorder, without psychotic features ([address]     Axis 4: Problems related to the social environment  Axis 5:  On Admission: 41-50 serious symptoms At Discharge: 61-70 mild symptoms   All conditions on Axis 1 and Axis 2 and active problems on Axis 3 were treated while patient was hospitalized. [facility] Problems    Diagnosis Date Noted    Borderline personality disorder ([facility] [address] [F60.3] 05/16/2020    PTSD (post-traumatic stress disorder) [F43.10] 05/16/2020    Severe episode of recurrent major depressive disorder, without psychotic features ([facility] [address] [F33.2] 05/16/2020    Cannabis abuse [F12.10]     Lissa-Danlos syndrome [Q79.60]    )   Condition on DC  Mood and affect are stable and pt is not suicidal   VITALS:  /77   Pulse 85   Temp 97.6 °F (36.4 °C) (Oral)   Resp 16   Ht 5' 8\" (1.727 m)   Wt 240 lb (108.9 kg)   LMP 05/16/2020   SpO2 99%   Breastfeeding No   BMI 36.49 kg/m²   Brief Summary Present Illness  CHIEF COMPLAINT:  Suicide attempt.     HISTORY OF PRESENT ILLNESS:  The patient is a 66-year-old female, who  was recently discharged from [facility] on 05/20/2020 and was brought to the ED  yesterday after she had attempted suicide.   The patient stated that  everything had been going relatively well from her discharge date a week  ago and she met with an intake therapist.  She stated that things went  badly after she started to talk with her \"boyfriendish. \"  She stated  that he makes her anxious and she stated \"I have standards. \"  She stated  that her friends and her boyfriend have been disappointing her and she  made the decision to try to kill herself. She placed an extension cord  around her neck in her home; and apparently, dad walked in. She stated  that she removed the cord before dad saw that. She stated that she  began crying to her dad and her father then brought her to the ED. She  stated that two nights ago, boyfriend wanted to go out without her. She  was feeling rejected and abandoned by her friends and by her boyfriend. She stated that she needs to contact with others and she does not feel  that they need the same from her. She stated that they use her for her  marijuana and otherwise ignore her. She is not actively suicidal at  this time. She stated that she is glad that she came back in to the  hospital because she feels that it is beneficial.  Sleep has been  intact. Appetite okay. During her prior hospitalization of four days,  we restarted lithium 600 mg daily, Cymbalta 30 mg twice a day, Klonopin  1 mg q.6 p.r.n. anxiety, trazodone 50 mg at night, and BuSpar 15 mg  three times a day. Hospital Course  Patient stabilized on meds and milieu treatment. Sid Bailey appears settled in program . She feels supported by patients which she stated is lacking her life. She continues to have vague SI but the intensity has lessened. Anxiety high at bed. Yesterday described a panic attack which as he was able to manage. Worried about not seeing dad before he deploys for 1 year on 6/1. We discussed that if she is stable at that point then dc is possible. Patient was discharged to home to continue recovery in the community.    PE: (reviewed) and labs (see medical H&PE)  Labs:    Admission on 05/27/2020, Discharged on 05/31/2020   Component Date Value Ref Range Status    SARS-CoV-2, NAAT 05/27/2020 Not Detected  Not Detected Final    Comment:

## 2020-09-03 NOTE — ED NOTES
[address] [address]             Phone:  [phone]  Fax:  [phone]       9/3/2020    TELEHEALTH EVALUATION -- Audio/Visual (During AGWSS-11 public health emergency)    HPI:  Chief Complaint   Patient presents with    Breast Pain         Kam Ortiz (:  1946) has requested an audio/video evaluation for the following concern(s):    Patient presents today for evaluation of right breast pain for several days. She does not feel a discreet lump or mass but there is a particular region of tenderness and \"shooting pain\". She is not due for routine mammogram until November; she has not ever had an abnormal mammogram. She denies family history of breast cancer. Review of Systems   Constitutional: Negative for chills and fever. HENT: Negative for ear pain, hearing loss, rhinorrhea and voice change. Eyes: Negative for photophobia and visual disturbance. Respiratory: Negative for cough and shortness of breath. Cardiovascular: Negative for chest pain and palpitations. Gastrointestinal: Negative for nausea and vomiting. Endocrine: Negative. Negative for cold intolerance and heat intolerance. Genitourinary: Negative for difficulty urinating and flank pain. Musculoskeletal: Negative for back pain and neck pain. Skin: Negative for color change and rash. Allergic/Immunologic: Negative for environmental allergies and food allergies. Neurological: Negative for dizziness, speech difficulty and headaches. Hematological: Does not bruise/bleed easily. Psychiatric/Behavioral: Negative for sleep disturbance and suicidal ideas. Prior to Visit Medications    Medication Sig Taking? Authorizing Provider   levothyroxine (SYNTHROID) 137 MCG tablet Take 1 tablet by mouth daily  Laura Davis APRN   venlafaxine (EFFEXOR XR) 150 MG extended release capsule TAKE 1 CAPSULE BY MOUTH EVERY DAY.   Bebeann Susan APRN   lisinopril (PRINIVIL;ZESTRIL) 10 Pt states the medication that was given to her including the ativan is making her feel like she is going to have a mental breakdown. RN asked what normally makes her feel better and her response was \"to vape but that's not an option\". RN offered her food and she denies being hungry. MD is aware.       Shelley Jane RN  05/27/20 7418 MG tablet TAKE 1 TABLET BY MOUTH EVERY DAY  Bevelyn Crooked, APRN   JARDIANCE 25 MG tablet TAKE 1 TABLET BY MOUTH EVERY DAY  Bevelyn Crooked, APRN   alendronate (FOSAMAX) 70 MG tablet Take 1 tablet by mouth every 7 days  Bevelyn Crooked, APRN   clopidogrel (PLAVIX) 75 MG tablet TAKE 1 TABLET BY MOUTH EVERY DAY  Aleta Primrose, MD   glimepiride (AMARYL) 4 MG tablet TAKE 1 TABLET BY MOUTH TWICE A DAY  Aleta Primrose, MD   lovastatin (MEVACOR) 40 MG tablet TAKE 1 TABLET BY MOUTH AT BEDTIME  Aleta Primrose, MD   aspirin 81 MG tablet Take 81 mg by mouth daily  Historical Provider, MD   metFORMIN (GLUCOPHAGE) 1000 MG tablet TAKE 1 TABLET BY MOUTH 2 TIMES DAILY (WITH MEALS)  Aleta Primrose, MD   vitamin D (CHOLECALCIFEROL) 1000 UNIT TABS tablet Take 1,000 Units by mouth daily  Historical Provider, MD   vitamin B-12 (CYANOCOBALAMIN) 1000 MCG tablet Take 5,000 mcg by mouth daily   Historical Provider, MD   Pediatric Multivitamins-Fl (MULT-VITAMIN/FLUORIDE PO) Take by mouth daily   Historical Provider, MD       Social History     Tobacco Use    Smoking status: Never Smoker    Smokeless tobacco: Never Used   Substance Use Topics    Alcohol use: No    Drug use: No        Allergies   Allergen Reactions    Bee Venom Shortness Of Breath and Swelling    Adhesive Tape Rash   ,   Past Medical History:   Diagnosis Date    CAD (coronary artery disease)     Carotid artery disease (HCC)     CPAP (continuous positive airway pressure) dependence     11cm    Depression     Diabetes ([facility] [address]     Fibromyalgia     Hypothyroidism     JOSE (obstructive sleep apnea)     AHI:  27.3 per PSG, 2011/CPAP    PLMD (periodic limb movement disorder)     RLS (restless legs syndrome)    ,   Past Surgical History:   Procedure Laterality Date    COLONOSCOPY  12/04/2014    Dr aRúl Chi- no polyps    COLONOSCOPY  07/25/2019    Dr Rudy Encinas, 5 yr recall    COLONOSCOPY N/A 7/25/2019    COLORECTAL CANCER SCREENING, NOT HIGH RISK performed by Lucas [facility]as [facility] at [facility]      teeth extractions    PTCA      with stents    TUBAL LIGATION     ,   Social History     Tobacco Use    Smoking status: Never Smoker    Smokeless tobacco: Never Used   Substance Use Topics    Alcohol use: No    Drug use: No   ,   Family History   Problem Relation Age of Onset    Colon Cancer Neg Hx     Colon Polyps Neg Hx     Esophageal Cancer Neg Hx     Liver Cancer Neg Hx     Liver Disease Neg Hx     Stomach Cancer Neg Hx     Rectal Cancer Neg Hx    ,   Immunization History   Administered Date(s) Administered    Influenza, High Dose (Fluzone 65 yrs and older) 09/20/2018    Influenza, Triv, inactivated, subunit, adjuvanted, IM (Fluad 65 yrs and older) 11/13/2019    Pneumococcal Conjugate 13-valent ([id number]) 11/01/2011, 01/03/2019    Pneumococcal Polysaccharide ([id number]) 11/01/2003, 01/08/2019    Tdap (Boostrix, Adacel) 06/09/2015   ,   Health Maintenance   Topic Date Due    Shingles Vaccine (1 of 2) 01/16/1996    Diabetic foot exam  09/20/2019    Flu vaccine (1) 09/01/2020    Diabetic retinal exam  09/09/2020    Annual Wellness Visit (AWV)  11/13/2020    Diabetic microalbuminuria test  11/20/2020    Breast cancer screen  11/21/2020    A1C test (Diabetic or Prediabetic)  07/17/2021    Lipid screen  07/17/2021    TSH testing  07/17/2021    Potassium monitoring  07/17/2021    Creatinine monitoring  07/17/2021    Colon cancer screen colonoscopy  07/25/2024    DTaP/Tdap/Td vaccine (2 - Td) 06/09/2025    DEXA (modify frequency per FRAX score)  Completed    Pneumococcal 65+ years Vaccine  Completed    Hepatitis C screen  Completed    Hepatitis A vaccine  Aged Out    Hib vaccine  Aged Out    Meningococcal (ACWY) vaccine  Aged Out       PHYSICAL EXAMINATION:  [ INSTRUCTIONS:  \"[x]\" Indicates a positive item  \"[]\" Indicates a negative item  -- DELETE ALL ITEMS NOT EXAMINED]  [x] Alert  [x] Oriented to person/place/time    [x] No apparent distress  [] Toxic appearing    [] Face flushed appearing [x] Sclera clear  [] Lips are cyanotic      [x] Breathing appears normal  [] Appears tachypneic      [] Rash on visible skin    [x] Cranial Nerves II-XII grossly intact    [x] Motor grossly intact in visible upper extremities    [x] Motor grossly intact in visible lower extremities    [x] Normal Mood  [] Anxious appearing    [] Depressed appearing  [] Confused appearing      [] Poor short term memory  [] Poor long term memory    [] OTHER:      Due to this being a TeleHealth encounter, evaluation of the following organ systems is limited: Vitals/Constitutional/EENT/Resp/CV/GI//MS/Neuro/Skin/Heme-Lymph-Imm. ASSESSMENT/PLAN:  1. Breast pain, right    - US BREAST COMPLETE RIGHT; Future    - patient is scheduled for AWV in November; we will follow-up in regards to ultrasound results and further treatment if necessary. Return if symptoms worsen or fail to improve. An  electronic signature was used to authenticate this note. --MIN Workman on 9/3/2020 at 2:07 PM        Pursuant to the emergency declaration under the [facility] waiver authority and the [employer] Act, this Virtual  Visit was conducted, with patient's consent, to reduce the patient's risk of exposure to COVID-19 and provide continuity of care for an established patient. Services were provided through a video synchronous discussion virtually to substitute for in-person clinic visit.

## 2021-03-04 ENCOUNTER — APPOINTMENT (OUTPATIENT)
Dept: CT IMAGING | Age: 21
End: 2021-03-04
Payer: OTHER GOVERNMENT

## 2021-03-04 ENCOUNTER — APPOINTMENT (OUTPATIENT)
Dept: ULTRASOUND IMAGING | Age: 21
End: 2021-03-04
Payer: OTHER GOVERNMENT

## 2021-03-04 ENCOUNTER — HOSPITAL ENCOUNTER (EMERGENCY)
Age: 21
Discharge: HOME | End: 2021-03-05
Payer: OTHER GOVERNMENT

## 2021-03-04 DIAGNOSIS — N20.1 URETERIC STONE: Primary | ICD-10-CM

## 2021-03-04 DIAGNOSIS — R31.29 OTHER MICROSCOPIC HEMATURIA: ICD-10-CM

## 2021-03-04 LAB
A/G RATIO: 1.7 (ref 1.1–2.2)
ALBUMIN SERPL-MCNC: 4.4 G/DL (ref 3.4–5)
ALP BLD-CCNC: 71 U/L (ref 40–129)
ALT SERPL-CCNC: 7 U/L (ref 10–40)
ANION GAP SERPL CALCULATED.3IONS-SCNC: 10 MMOL/L (ref 3–16)
AST SERPL-CCNC: 17 U/L (ref 15–37)
BASOPHILS ABSOLUTE: 0.1 K/UL (ref 0–0.2)
BASOPHILS RELATIVE PERCENT: 0.7 %
BILIRUB SERPL-MCNC: 0.4 MG/DL (ref 0–1)
BILIRUBIN URINE: NEGATIVE
BLOOD, URINE: ABNORMAL
BUN BLDV-MCNC: 12 MG/DL (ref 7–20)
CALCIUM SERPL-MCNC: 9.7 MG/DL (ref 8.3–10.6)
CHLORIDE BLD-SCNC: 102 MMOL/L (ref 99–110)
CLARITY: CLEAR
CO2: 25 MMOL/L (ref 21–32)
COLOR: YELLOW
CREAT SERPL-MCNC: 0.7 MG/DL (ref 0.6–1.1)
EOSINOPHILS ABSOLUTE: 0.1 K/UL (ref 0–0.6)
EOSINOPHILS RELATIVE PERCENT: 0.8 %
EPITHELIAL CELLS, UA: ABNORMAL /HPF (ref 0–5)
GFR AFRICAN AMERICAN: >60
GFR NON-AFRICAN AMERICAN: >60
GLOBULIN: 2.6 G/DL
GLUCOSE BLD-MCNC: 93 MG/DL (ref 70–99)
GLUCOSE URINE: NEGATIVE MG/DL
HCG QUALITATIVE: NEGATIVE
HCT VFR BLD CALC: 40.7 % (ref 36–48)
HEMOGLOBIN: 13.5 G/DL (ref 12–16)
KETONES, URINE: NEGATIVE MG/DL
LEUKOCYTE ESTERASE, URINE: NEGATIVE
LIPASE: 17 U/L (ref 13–60)
LYMPHOCYTES ABSOLUTE: 2.5 K/UL (ref 1–5.1)
LYMPHOCYTES RELATIVE PERCENT: 22.4 %
MCH RBC QN AUTO: 29.1 PG (ref 26–34)
MCHC RBC AUTO-ENTMCNC: 33.1 G/DL (ref 31–36)
MCV RBC AUTO: 88.2 FL (ref 80–100)
MICROSCOPIC EXAMINATION: YES
MONOCYTES ABSOLUTE: 0.6 K/UL (ref 0–1.3)
MONOCYTES RELATIVE PERCENT: 5.2 %
MUCUS: ABNORMAL /LPF
NEUTROPHILS ABSOLUTE: 8 K/UL (ref 1.7–7.7)
NEUTROPHILS RELATIVE PERCENT: 70.9 %
NITRITE, URINE: NEGATIVE
PDW BLD-RTO: 12.6 % (ref 12.4–15.4)
PH UA: 6 (ref 5–8)
PLATELET # BLD: 234 K/UL (ref 135–450)
PMV BLD AUTO: 8.3 FL (ref 5–10.5)
POTASSIUM SERPL-SCNC: 4 MMOL/L (ref 3.5–5.1)
PROTEIN UA: NEGATIVE MG/DL
RBC # BLD: 4.62 M/UL (ref 4–5.2)
RBC UA: ABNORMAL /HPF (ref 0–4)
SODIUM BLD-SCNC: 137 MMOL/L (ref 136–145)
SPECIFIC GRAVITY UA: >=1.03 (ref 1–1.03)
TOTAL PROTEIN: 7 G/DL (ref 6.4–8.2)
URINE REFLEX TO CULTURE: ABNORMAL
URINE TYPE: ABNORMAL
UROBILINOGEN, URINE: 0.2 E.U./DL
WBC # BLD: 11.3 K/UL (ref 4–11)
WBC UA: ABNORMAL /HPF (ref 0–5)

## 2021-03-04 PROCEDURE — 6360000002 HC RX W HCPCS: Performed by: NURSE PRACTITIONER

## 2021-03-04 PROCEDURE — 96374 THER/PROPH/DIAG INJ IV PUSH: CPT

## 2021-03-04 PROCEDURE — 81001 URINALYSIS AUTO W/SCOPE: CPT

## 2021-03-04 PROCEDURE — 2580000003 HC RX 258: Performed by: NURSE PRACTITIONER

## 2021-03-04 PROCEDURE — 85025 COMPLETE CBC W/AUTO DIFF WBC: CPT

## 2021-03-04 PROCEDURE — 6360000004 HC RX CONTRAST MEDICATION: Performed by: NURSE PRACTITIONER

## 2021-03-04 PROCEDURE — 80053 COMPREHEN METABOLIC PANEL: CPT

## 2021-03-04 PROCEDURE — 96375 TX/PRO/DX INJ NEW DRUG ADDON: CPT

## 2021-03-04 PROCEDURE — 84703 CHORIONIC GONADOTROPIN ASSAY: CPT

## 2021-03-04 PROCEDURE — 83690 ASSAY OF LIPASE: CPT

## 2021-03-04 PROCEDURE — 6370000000 HC RX 637 (ALT 250 FOR IP): Performed by: NURSE PRACTITIONER

## 2021-03-04 PROCEDURE — 74177 CT ABD & PELVIS W/CONTRAST: CPT

## 2021-03-04 PROCEDURE — 76856 US EXAM PELVIC COMPLETE: CPT

## 2021-03-04 PROCEDURE — 99285 EMERGENCY DEPT VISIT HI MDM: CPT

## 2021-03-04 PROCEDURE — 93975 VASCULAR STUDY: CPT

## 2021-03-04 PROCEDURE — 76830 TRANSVAGINAL US NON-OB: CPT

## 2021-03-04 RX ORDER — KETOROLAC TROMETHAMINE 30 MG/ML
30 INJECTION, SOLUTION INTRAMUSCULAR; INTRAVENOUS ONCE
Status: COMPLETED | OUTPATIENT
Start: 2021-03-04 | End: 2021-03-04

## 2021-03-04 RX ORDER — 0.9 % SODIUM CHLORIDE 0.9 %
1000 INTRAVENOUS SOLUTION INTRAVENOUS ONCE
Status: COMPLETED | OUTPATIENT
Start: 2021-03-04 | End: 2021-03-04

## 2021-03-04 RX ORDER — ACETAMINOPHEN 500 MG
1000 TABLET ORAL ONCE
Status: COMPLETED | OUTPATIENT
Start: 2021-03-04 | End: 2021-03-04

## 2021-03-04 RX ORDER — ONDANSETRON 2 MG/ML
4 INJECTION INTRAMUSCULAR; INTRAVENOUS ONCE
Status: COMPLETED | OUTPATIENT
Start: 2021-03-04 | End: 2021-03-04

## 2021-03-04 RX ADMIN — IOPAMIDOL 75 ML: 755 INJECTION, SOLUTION INTRAVENOUS at 22:53

## 2021-03-04 RX ADMIN — SODIUM CHLORIDE 1000 ML: 9 INJECTION, SOLUTION INTRAVENOUS at 22:18

## 2021-03-04 RX ADMIN — KETOROLAC TROMETHAMINE 30 MG: 30 INJECTION, SOLUTION INTRAMUSCULAR at 22:18

## 2021-03-04 RX ADMIN — ONDANSETRON 4 MG: 2 INJECTION INTRAMUSCULAR; INTRAVENOUS at 22:18

## 2021-03-04 RX ADMIN — ACETAMINOPHEN 1000 MG: 500 TABLET ORAL at 22:38

## 2021-03-04 ASSESSMENT — PAIN SCALES - GENERAL
PAINLEVEL_OUTOF10: 10
PAINLEVEL_OUTOF10: 9
PAINLEVEL_OUTOF10: 10

## 2021-03-05 VITALS
BODY MASS INDEX: 40.92 KG/M2 | OXYGEN SATURATION: 100 % | SYSTOLIC BLOOD PRESSURE: 142 MMHG | HEIGHT: 68 IN | WEIGHT: 270 LBS | RESPIRATION RATE: 14 BRPM | DIASTOLIC BLOOD PRESSURE: 87 MMHG | HEART RATE: 74 BPM | TEMPERATURE: 97.6 F

## 2021-03-05 LAB — SPECIMEN STATUS: NORMAL

## 2021-03-05 PROCEDURE — 6370000000 HC RX 637 (ALT 250 FOR IP): Performed by: NURSE PRACTITIONER

## 2021-03-05 PROCEDURE — 96375 TX/PRO/DX INJ NEW DRUG ADDON: CPT

## 2021-03-05 PROCEDURE — 6360000002 HC RX W HCPCS: Performed by: NURSE PRACTITIONER

## 2021-03-05 RX ORDER — ONDANSETRON 4 MG/1
4 TABLET, ORALLY DISINTEGRATING ORAL EVERY 8 HOURS PRN
Qty: 20 TABLET | Refills: 0 | Status: SHIPPED | OUTPATIENT
Start: 2021-03-05

## 2021-03-05 RX ORDER — ONDANSETRON 4 MG/1
4 TABLET, ORALLY DISINTEGRATING ORAL ONCE
Status: COMPLETED | OUTPATIENT
Start: 2021-03-05 | End: 2021-03-05

## 2021-03-05 RX ORDER — OXYCODONE HYDROCHLORIDE AND ACETAMINOPHEN 5; 325 MG/1; MG/1
1 TABLET ORAL ONCE
Status: COMPLETED | OUTPATIENT
Start: 2021-03-05 | End: 2021-03-05

## 2021-03-05 RX ORDER — KETOROLAC TROMETHAMINE 10 MG/1
10 TABLET, FILM COATED ORAL EVERY 6 HOURS PRN
Qty: 20 TABLET | Refills: 0 | Status: SHIPPED | OUTPATIENT
Start: 2021-03-05 | End: 2021-09-15 | Stop reason: ALTCHOICE

## 2021-03-05 RX ORDER — TAMSULOSIN HYDROCHLORIDE 0.4 MG/1
0.4 CAPSULE ORAL ONCE
Status: COMPLETED | OUTPATIENT
Start: 2021-03-05 | End: 2021-03-05

## 2021-03-05 RX ORDER — FENTANYL CITRATE 50 UG/ML
50 INJECTION, SOLUTION INTRAMUSCULAR; INTRAVENOUS ONCE
Status: COMPLETED | OUTPATIENT
Start: 2021-03-05 | End: 2021-03-05

## 2021-03-05 RX ORDER — TAMSULOSIN HYDROCHLORIDE 0.4 MG/1
0.4 CAPSULE ORAL DAILY
Qty: 5 CAPSULE | Refills: 0 | Status: SHIPPED | OUTPATIENT
Start: 2021-03-05 | End: 2021-09-15 | Stop reason: ALTCHOICE

## 2021-03-05 RX ORDER — OXYCODONE HYDROCHLORIDE AND ACETAMINOPHEN 5; 325 MG/1; MG/1
1 TABLET ORAL EVERY 6 HOURS PRN
Qty: 12 TABLET | Refills: 0 | Status: SHIPPED | OUTPATIENT
Start: 2021-03-05 | End: 2021-03-08

## 2021-03-05 RX ADMIN — TAMSULOSIN HYDROCHLORIDE 0.4 MG: 0.4 CAPSULE ORAL at 00:27

## 2021-03-05 RX ADMIN — FENTANYL CITRATE 50 MCG: 50 INJECTION, SOLUTION INTRAMUSCULAR; INTRAVENOUS at 00:46

## 2021-03-05 RX ADMIN — ONDANSETRON 4 MG: 4 TABLET, ORALLY DISINTEGRATING ORAL at 00:27

## 2021-03-05 RX ADMIN — OXYCODONE HYDROCHLORIDE AND ACETAMINOPHEN 1 TABLET: 5; 325 TABLET ORAL at 00:27

## 2021-03-05 NOTE — ED PROVIDER NOTES
week: Not on file     Minutes per session: Not on file    Stress: Not on file   Relationships    Social connections     Talks on phone: Not on file     Gets together: Not on file     Attends Denominational service: Not on file     Active member of club or organization: Not on file     Attends meetings of clubs or organizations: Not on file     Relationship status: Not on file    Intimate partner violence     Fear of current or ex partner: Not on file     Emotionally abused: Not on file     Physically abused: Not on file     Forced sexual activity: Not on file   Other Topics Concern    Not on file   Social History Narrative    Not on file     No current facility-administered medications for this encounter. Current Outpatient Medications   Medication Sig Dispense Refill    oxyCODONE-acetaminophen (PERCOCET) 5-325 MG per tablet Take 1 tablet by mouth every 6 hours as needed for Pain for up to 3 days. Intended supply: 3 days. Take lowest dose possible to manage pain 12 tablet 0    ondansetron (ZOFRAN ODT) 4 MG disintegrating tablet Take 1 tablet by mouth every 8 hours as needed for Nausea 20 tablet 0    tamsulosin (FLOMAX) 0.4 MG capsule Take 1 capsule by mouth daily for 5 doses 5 capsule 0    ketorolac (TORADOL) 10 MG tablet Take 1 tablet by mouth every 6 hours as needed for Pain 20 tablet 0    QUEtiapine Fumarate (SEROQUEL PO) Take by mouth      DULoxetine (CYMBALTA) 30 MG extended release capsule Take 1 capsule by mouth 2 times daily 60 capsule 0    clonazePAM (KLONOPIN) 1 MG tablet Take 1 tablet by mouth nightly as needed for Anxiety for up to 30 days.  30 tablet 0    busPIRone (BUSPAR) 15 MG tablet Take 15 mg by mouth 3 times daily 90 tablet 0    DULoxetine (CYMBALTA) 30 MG extended release capsule Take 1 capsule by mouth 2 times daily 60 capsule 0    ferrous sulfate (IRON 325) 325 (65 Fe) MG tablet Take 1 tablet by mouth 2 times daily (with meals) 60 tablet 0     Allergies   Allergen Reactions    Latex Hives    Benadryl [Diphenhydramine]     Morphine Anxiety       REVIEW OF SYSTEMS  10 systems reviewed, pertinent positives per HPI otherwise noted to be negative    PHYSICAL EXAM  BP (!) 145/87   Pulse 88   Temp 97.6 °F (36.4 °C) (Axillary)   Resp 18   Ht 5' 8\" (1.727 m)   Wt 270 lb (122.5 kg)   SpO2 100%   BMI 41.05 kg/m²   GENERAL APPEARANCE: Awake and alert. Cooperative. No acute distress. Vital signs are stable. Well appearing and non toxic. HEAD: Normocephalic. Atraumatic. EYES: PERRL. EOM's grossly intact. ENT: Mucous membranes are moist.   NECK: Supple. Normal ROM. HEART: RRR. Distal pulses are equal and intact. Cap refill less than 2 seconds. LUNGS: Respirations unlabored. CTAB. Good air exchange. Speaking comfortably in full sentences. No wheezing, rhonchi, rales, stridor. ABDOMEN: Soft. Non-distended. Non-tender. No guarding or rebound. No rigidity. Bowel sounds are present. Negative farmer's. Negative McBurney's point. Negative CVA tenderness. EXTREMITIES: No peripheral edema. Moves all extremities equally. All extremities neurovascularly intact. SKIN: Warm and dry. No acute rashes. NEUROLOGICAL: Alert and oriented. No gross facial drooping. Strength 5/5, sensation intact. PSYCHIATRIC: Normal mood and affect. SCREENINGS       RADIOLOGY  Us Non Ob Transvaginal    Result Date: 3/5/2021  EXAMINATION: PELVIC ULTRASOUND; DOPPLER EVALUATION OF THE PELVIS 3/4/2021; 3/5/2021 TECHNIQUE: Transabdominal and transvaginal pelvic ultrasound was performed. Color Doppler evaluation was performed.  COMPARISON: None HISTORY: ORDERING SYSTEM PROVIDED HISTORY: rlq pain r/o torsion TECHNOLOGIST PROVIDED HISTORY: Reason for exam:->rlq pain r/o torsion; ORDERING SYSTEM PROVIDED HISTORY: rlq pain r/o torsion TECHNOLOGIST PROVIDED HISTORY: Reason for exam:->rlq pain r/o torsion Reason for Exam: rlq pain r/o torsion Acuity: Acute FINDINGS: Measurements: Uterus:  2.5 x 4.2 x 6.7 cm Endometrial 1. Normal flow in both ovaries. 2. Distal right ureteral calculus. Ct Abdomen Pelvis W Iv Contrast Additional Contrast? None    Result Date: 3/5/2021  EXAMINATION: CT OF THE ABDOMEN AND PELVIS WITH CONTRAST 3/4/2021 10:42 pm TECHNIQUE: CT of the abdomen and pelvis was performed with the administration of intravenous contrast. Multiplanar reformatted images are provided for review. Dose modulation, iterative reconstruction, and/or weight based adjustment of the mA/kV was utilized to reduce the radiation dose to as low as reasonably achievable. COMPARISON: 04/27/2020 HISTORY: ORDERING SYSTEM PROVIDED HISTORY: rlq pain TECHNOLOGIST PROVIDED HISTORY: Reason for exam:->rlq pain Additional Contrast?->None Decision Support Exception->Emergency Medical Condition (MA) Reason for Exam: rlq pain Relevant Medical/Surgical History: no surg hx FINDINGS: Lower Chest:  Visualized portion of the lower chest demonstrates no acute abnormality. Organs: There is mild right hydronephrosis and hydroureter to the level of ureterovesicular junction where there is a punctate stone. Associated asymmetric hypoenhancement of the right kidney. The liver, spleen, pancreas, left kidney and adrenal glands are without significant findings. GI/Bowel: Normal appendix. No mechanical bowel obstruction. Pelvis: The urinary bladder is partially distended without contour abnormality. Age-appropriate appearance of the uterus and ovaries. Peritoneum/Retroperitoneum: No lymphadenopathy. Bones/Soft Tissues: No acute or aggressive osseous lesions. Obstructing punctate stone at the right ureterovesicular junction with mild right hydronephrosis and hydroureter. Normal appendix. Us Dup Abd Pel Retro Scrot Complete    Result Date: 3/5/2021  EXAMINATION: PELVIC ULTRASOUND; DOPPLER EVALUATION OF THE PELVIS 3/4/2021; 3/5/2021 TECHNIQUE: Transabdominal and transvaginal pelvic ultrasound was performed. Color Doppler evaluation was performed. COMPARISON: None HISTORY: ORDERING SYSTEM PROVIDED HISTORY: rlq pain r/o torsion TECHNOLOGIST PROVIDED HISTORY: Reason for exam:->rlq pain r/o torsion; ORDERING SYSTEM PROVIDED HISTORY: rlq pain r/o torsion TECHNOLOGIST PROVIDED HISTORY: Reason for exam:->rlq pain r/o torsion Reason for Exam: rlq pain r/o torsion Acuity: Acute FINDINGS: Measurements: Uterus:  2.5 x 4.2 x 6.7 cm Endometrial stripe:  3 mm Right Ovary:  1.7 x 1.9 x 3.0 cm Left Ovary:  2.1 x 2.9 x 3.7 cm Ultrasound Findings: Uterus: Uterus demonstrates normal myometrial echotexture. Endometrial stripe: Endometrial stripe is within normal limits. Right Ovary: Right ovary is within normal limits. There is normal arterial and venous doppler flow. Left Ovary:  Left ovary is within normal limits. There is normal arterial and venous doppler flow. Free Fluid: No evidence of free fluid. A 2 x 3 mm echogenic structure is identified in the right adnexa without posterior shadowing which may correspond with the known distal ureteral calculus. 1. Normal flow in both ovaries. 2. Distal right ureteral calculus. ED COURSE/MDM  Patient seen and evaluated. Old records reviewed. Diagnostic testing reviewed and results discussed. I have independently evaluated this patient based upon my scope of practice. Supervising physician was in the department for consultation as needed. Carolyn Samuel presented to the ED today with above noted complaints. Patient given Toradol, Tylenol, Zofran, sodium chloride bolus upon arrival to emergency department. CBC and CMP unremarkable mild leukocytosis 11.3, no bandemia, no anemia, no acute kidney injury signs of transaminitis or pancreatitis. No electrolyte abnormality. Pregnancy negative. Urinalysis negative for infection with trace blood. Microscopic shows no WBCs.     CT of the abdomen and pelvis shows an obstructing punctate stone at the right ureterovesicular junction with mild right hydronephrosis and hydroureter. Normal appendix. Transvaginal ultrasound shows normal flow in both ovaries. They again note distal right ureteral calculus. Patient provided with Flomax, pain medication, antinausea medication, urine strainer for home and a referral for urology for further follow-up and management of obstructing punctate stone. Patient received Toradol and Percocet for pain, with good relief. While in ED patient received   Medications   ketorolac (TORADOL) injection 30 mg (30 mg Intravenous Given 3/4/21 2218)   ondansetron (ZOFRAN) injection 4 mg (4 mg Intravenous Given 3/4/21 2218)   0.9 % sodium chloride bolus (0 mLs Intravenous Stopped 3/4/21 2344)   acetaminophen (TYLENOL) tablet 1,000 mg (1,000 mg Oral Given 3/4/21 2238)   iopamidol (ISOVUE-370) 76 % injection 75 mL (75 mLs Intravenous Given 3/4/21 2253)   tamsulosin (FLOMAX) capsule 0.4 mg (0.4 mg Oral Given 3/5/21 0027)   oxyCODONE-acetaminophen (PERCOCET) 5-325 MG per tablet 1 tablet (1 tablet Oral Given 3/5/21 0027)   ondansetron (ZOFRAN-ODT) disintegrating tablet 4 mg (4 mg Oral Given 3/5/21 0027)   fentaNYL (SUBLIMAZE) injection 50 mcg (50 mcg Intravenous Given 3/5/21 0046)             At this point I do not feel the patient requires further work up and it is reasonable to discharge the patient. A discussion was had with the patient and/or their surrogate regarding diagnosis, diagnostic testing results, treatment/ plan of care, and follow up. There was shared decision-making between myself as well as the patient and/or their surrogate and we are all in agreement with discharge home. There was an opportunity for questions and all questions were answered to the best of my ability and to the satisfaction of the patient and/or patient family. Patient will follow up with urologu for further evaluation/treatment. The patient was given strict return precautions as we discussed symptoms that would necessitate return to the ED.  Patient will >=1.030 1.005 - 1.030    Blood, Urine TRACE-INTACT (A) Negative    pH, UA 6.0 5.0 - 8.0    Protein, UA Negative Negative mg/dL    Urobilinogen, Urine 0.2 <2.0 E.U./dL    Nitrite, Urine Negative Negative    Leukocyte Esterase, Urine Negative Negative    Microscopic Examination YES     Urine Type NotGiven     Urine Reflex to Culture Not Indicated    HCG Qualitative, Serum   Result Value Ref Range    hCG Qual Negative Detects HCG level >10 MIU/mL   Microscopic Urinalysis   Result Value Ref Range    Mucus, UA Rare (A) None Seen /LPF    WBC, UA None seen 0 - 5 /HPF    RBC, UA 3-4 0 - 4 /HPF    Epithelial Cells, UA 2-5 0 - 5 /HPF       I estimate there is LOW risk for ACUTE APPENDICITIS, BOWEL OBSTRUCTION, CHOLECYSTITIS, DIVERTICULITIS, INCARCERATED HERNIA, PANCREATITIS, PELVIC INFLAMMATORY DISEASE, PERFORATED BOWEL or ULCER, PREGNANCY, or TUBO-OVARIAN ABSCESS, thus I consider the discharge disposition reasonable. Also, there is no evidence or peritonitis, sepsis, or toxicity. Ev Ross and I have discussed the diagnosis and risks, and we agree with discharging home to follow-up with their primary doctor. We also discussed returning to the Emergency Department immediately if new or worsening symptoms occur. We have discussed the symptoms which are most concerning (e.g., bloody stool, fever, changing or worsening pain, vomiting) that necessitate immediate return. Final Impression    1. Ureteric stone    2. Other microscopic hematuria        Blood pressure (!) 145/87, pulse 88, temperature 97.6 °F (36.4 °C), temperature source Axillary, resp. rate 18, height 5' 8\" (1.727 m), weight 270 lb (122.5 kg), SpO2 100 %, not currently breastfeeding.mdm    Patient was sent home with a prescription for below medication/s. I did [place] patient on appropriate use of these medication.   New Prescriptions    KETOROLAC (TORADOL) 10 MG TABLET    Take 1 tablet by mouth every 6 hours as needed for Pain    ONDANSETRON (ZOFRAN ODT) 4

## 2021-03-05 NOTE — ED NOTES
Pt mom came out in the hallway asking for the Nurse to get her something for nausea. I made pt mom aware that she has to stay in the room/push the call button for assistance, she  cannot come out looking for the Nurse d/t COVID. Jolynn Mayes RN made aware of pt wanting something for nausea. Jolynn Mayes RN made me aware that the Dr will not give her anything else for pain or nausea until some of  the x-ray results come back. I made pt and mom aware at bedside.      Urvashi Campuzano LPN  25/31/82 0618

## 2021-09-15 ENCOUNTER — HOSPITAL ENCOUNTER (EMERGENCY)
Age: 21
Discharge: HOME | End: 2021-09-15
Attending: EMERGENCY MEDICINE
Payer: COMMERCIAL

## 2021-09-15 ENCOUNTER — APPOINTMENT (OUTPATIENT)
Dept: CT IMAGING | Age: 21
End: 2021-09-15
Payer: COMMERCIAL

## 2021-09-15 VITALS
DIASTOLIC BLOOD PRESSURE: 71 MMHG | TEMPERATURE: 98.1 F | BODY MASS INDEX: 40.92 KG/M2 | RESPIRATION RATE: 15 BRPM | OXYGEN SATURATION: 99 % | HEIGHT: 68 IN | WEIGHT: 270 LBS | HEART RATE: 78 BPM | SYSTOLIC BLOOD PRESSURE: 129 MMHG

## 2021-09-15 DIAGNOSIS — Z87.442 HISTORY OF KIDNEY STONES: ICD-10-CM

## 2021-09-15 DIAGNOSIS — R10.9 FLANK PAIN: Primary | ICD-10-CM

## 2021-09-15 LAB
A/G RATIO: 1.4 (ref 1.1–2.2)
ALBUMIN SERPL-MCNC: 4.2 G/DL (ref 3.4–5)
ALP BLD-CCNC: 70 U/L (ref 40–129)
ALT SERPL-CCNC: 9 U/L (ref 10–40)
ANION GAP SERPL CALCULATED.3IONS-SCNC: 11 MMOL/L (ref 3–16)
AST SERPL-CCNC: 18 U/L (ref 15–37)
BASOPHILS ABSOLUTE: 0 K/UL (ref 0–0.2)
BASOPHILS RELATIVE PERCENT: 0.2 %
BILIRUB SERPL-MCNC: 0.3 MG/DL (ref 0–1)
BILIRUBIN URINE: ABNORMAL
BLOOD, URINE: NEGATIVE
BUN BLDV-MCNC: 10 MG/DL (ref 7–20)
CALCIUM SERPL-MCNC: 9.2 MG/DL (ref 8.3–10.6)
CHLORIDE BLD-SCNC: 106 MMOL/L (ref 99–110)
CLARITY: CLEAR
CO2: 20 MMOL/L (ref 21–32)
COLOR: YELLOW
CREAT SERPL-MCNC: <0.5 MG/DL (ref 0.6–1.1)
EOSINOPHILS ABSOLUTE: 0.1 K/UL (ref 0–0.6)
EOSINOPHILS RELATIVE PERCENT: 1.5 %
GFR AFRICAN AMERICAN: >60
GFR NON-AFRICAN AMERICAN: >60
GLOBULIN: 2.9 G/DL
GLUCOSE BLD-MCNC: 88 MG/DL (ref 70–99)
GLUCOSE URINE: NEGATIVE MG/DL
HCG(URINE) PREGNANCY TEST: NEGATIVE
HCT VFR BLD CALC: 38.6 % (ref 36–48)
HEMOGLOBIN: 13 G/DL (ref 12–16)
KETONES, URINE: ABNORMAL MG/DL
LEUKOCYTE ESTERASE, URINE: NEGATIVE
LYMPHOCYTES ABSOLUTE: 3.2 K/UL (ref 1–5.1)
LYMPHOCYTES RELATIVE PERCENT: 34.7 %
MCH RBC QN AUTO: 30.1 PG (ref 26–34)
MCHC RBC AUTO-ENTMCNC: 33.6 G/DL (ref 31–36)
MCV RBC AUTO: 89.4 FL (ref 80–100)
MICROSCOPIC EXAMINATION: YES
MONOCYTES ABSOLUTE: 0.5 K/UL (ref 0–1.3)
MONOCYTES RELATIVE PERCENT: 5.2 %
NEUTROPHILS ABSOLUTE: 5.5 K/UL (ref 1.7–7.7)
NEUTROPHILS RELATIVE PERCENT: 58.4 %
NITRITE, URINE: NEGATIVE
PDW BLD-RTO: 13 % (ref 12.4–15.4)
PH UA: 6.5 (ref 5–8)
PLATELET # BLD: 250 K/UL (ref 135–450)
PMV BLD AUTO: 8.6 FL (ref 5–10.5)
POTASSIUM REFLEX MAGNESIUM: 4.2 MMOL/L (ref 3.5–5.1)
PROTEIN UA: ABNORMAL MG/DL
RBC # BLD: 4.32 M/UL (ref 4–5.2)
SODIUM BLD-SCNC: 137 MMOL/L (ref 136–145)
SPECIFIC GRAVITY UA: >=1.03 (ref 1–1.03)
TOTAL PROTEIN: 7.1 G/DL (ref 6.4–8.2)
URINE TYPE: ABNORMAL
UROBILINOGEN, URINE: 1 E.U./DL
WBC # BLD: 9.3 K/UL (ref 4–11)

## 2021-09-15 PROCEDURE — 6360000002 HC RX W HCPCS: Performed by: EMERGENCY MEDICINE

## 2021-09-15 PROCEDURE — 74176 CT ABD & PELVIS W/O CONTRAST: CPT

## 2021-09-15 PROCEDURE — 85025 COMPLETE CBC W/AUTO DIFF WBC: CPT

## 2021-09-15 PROCEDURE — 84703 CHORIONIC GONADOTROPIN ASSAY: CPT

## 2021-09-15 PROCEDURE — 99284 EMERGENCY DEPT VISIT MOD MDM: CPT

## 2021-09-15 PROCEDURE — 81003 URINALYSIS AUTO W/O SCOPE: CPT

## 2021-09-15 PROCEDURE — 96374 THER/PROPH/DIAG INJ IV PUSH: CPT

## 2021-09-15 PROCEDURE — 2580000003 HC RX 258: Performed by: EMERGENCY MEDICINE

## 2021-09-15 PROCEDURE — 80053 COMPREHEN METABOLIC PANEL: CPT

## 2021-09-15 PROCEDURE — 96375 TX/PRO/DX INJ NEW DRUG ADDON: CPT

## 2021-09-15 RX ORDER — ONDANSETRON 4 MG/1
4 TABLET, ORALLY DISINTEGRATING ORAL EVERY 8 HOURS PRN
Qty: 15 TABLET | Refills: 0 | Status: SHIPPED | OUTPATIENT
Start: 2021-09-15

## 2021-09-15 RX ORDER — IBUPROFEN 600 MG/1
600 TABLET ORAL EVERY 6 HOURS PRN
Qty: 40 TABLET | Refills: 0 | Status: SHIPPED | OUTPATIENT
Start: 2021-09-15

## 2021-09-15 RX ORDER — 0.9 % SODIUM CHLORIDE 0.9 %
1000 INTRAVENOUS SOLUTION INTRAVENOUS ONCE
Status: COMPLETED | OUTPATIENT
Start: 2021-09-15 | End: 2021-09-15

## 2021-09-15 RX ORDER — KETOROLAC TROMETHAMINE 30 MG/ML
30 INJECTION, SOLUTION INTRAMUSCULAR; INTRAVENOUS ONCE
Status: COMPLETED | OUTPATIENT
Start: 2021-09-15 | End: 2021-09-15

## 2021-09-15 RX ORDER — ONDANSETRON 2 MG/ML
4 INJECTION INTRAMUSCULAR; INTRAVENOUS ONCE
Status: COMPLETED | OUTPATIENT
Start: 2021-09-15 | End: 2021-09-15

## 2021-09-15 RX ADMIN — KETOROLAC TROMETHAMINE 30 MG: 30 INJECTION, SOLUTION INTRAMUSCULAR; INTRAVENOUS at 20:15

## 2021-09-15 RX ADMIN — SODIUM CHLORIDE 1000 ML: 9 INJECTION, SOLUTION INTRAVENOUS at 20:14

## 2021-09-15 RX ADMIN — ONDANSETRON HYDROCHLORIDE 4 MG: 2 INJECTION, SOLUTION INTRAMUSCULAR; INTRAVENOUS at 20:16

## 2021-09-15 ASSESSMENT — PAIN SCALES - GENERAL
PAINLEVEL_OUTOF10: 1
PAINLEVEL_OUTOF10: 8

## 2021-09-15 NOTE — LETTER
[facility]  [address] [address]  Phone: [phone]               September 15, 2021    Patient: Brooke Hernandez   YOB: 2000   Date of Visit: 9/15/2021       To Whom It May Concern:    Brooke Hernandez was seen and treated in our emergency department on 9/15/2021. She may return to work on 9/17/2021.       Sincerely,       Ramya Shah RN         Signature:__________________________________

## 2021-09-16 NOTE — ED PROVIDER NOTES
CHIEF COMPLAINT  Flank Pain (right flank pain started 2 hrs ago hx small kidney stone (was able to pass on her own)  6 mos ago feels the same. Doest not feel ill. ) and Dysuria (mild dysuria)      HISTORY OF PRESENT ILLNESS  Alray Collet is a 24 y.o. female with a history of depression and Lissa-Danlos syndrome who presents to the ED complaining of flank pain. Patient reports that she began developing right flank pain approximately 2 hours prior to arrival.  Patient reports recent history of kidney stones and states that symptoms are identical to previous kidney stone pain. Mild dysuria has also been reported. Patient denies fevers, chills, or sweats. No nausea, vomiting, or diarrhea. Pain is rated as 8/10 upon arrival.  No vaginal complaints noted. .   No other complaints, modifying factors or associated symptoms. I have reviewed the following from the nursing documentation.     Past Medical History:   Diagnosis Date    Depression     Lissa-Danlos syndrome      Past Surgical History:   Procedure Laterality Date    BREAST REDUCTION SURGERY Bilateral 2017    BREAST REDUCTION SURGERY       Family History   Problem Relation Age of Onset    Mental Illness Mother     Other Mother     Mental Illness Maternal Cousin      Social History     Socioeconomic History    Marital status: Single     Spouse name: Not on file    Number of children: 0    Years of education: 15    Highest education level: Not on file   Occupational History    Not on file   Tobacco Use    Smoking status: Never Smoker    Smokeless tobacco: Never Used   Vaping Use    Vaping Use: Never used   Substance and Sexual Activity    Alcohol use: No    Drug use: Yes     Types: Marijuana     Comment: daily     Sexual activity: Yes     Partners: Male   Other Topics Concern    Not on file   Social History Narrative    Not on file     Social Determinants of Health     Financial Resource Strain:     Difficulty of Paying Living Expenses:    Food Insecurity:     Worried About Running Out of Food in the Last Year:     [address] in the Last Year:    Transportation Needs:     Lack of Transportation (Medical):  Lack of Transportation (Non-Medical):    Physical Activity:     Days of Exercise per Week:     Minutes of Exercise per Session:    Stress:     Feeling of Stress :    Social Connections:     Frequency of Communication with Friends and Family:     Frequency of Social Gatherings with Friends and Family:     Attends [employer] Services:     Active Member of Clubs or Organizations:     Attends Club or Organization Meetings:     Marital Status:    Intimate Partner Violence:     Fear of Current or Ex-Partner:     Emotionally Abused:     Physically Abused:     Sexually Abused:      No current facility-administered medications for this encounter. Current Outpatient Medications   Medication Sig Dispense Refill    NONFORMULARY Birth  Control Pill      ibuprofen (IBU) 600 MG tablet Take 1 tablet by mouth every 6 hours as needed for Pain 40 tablet 0    ondansetron (ZOFRAN ODT) 4 MG disintegrating tablet Take 1 tablet by mouth every 8 hours as needed for Nausea or Vomiting 15 tablet 0    ondansetron (ZOFRAN ODT) 4 MG disintegrating tablet Take 1 tablet by mouth every 8 hours as needed for Nausea 20 tablet 0    QUEtiapine Fumarate (SEROQUEL PO) Take by mouth      busPIRone (BUSPAR) 15 MG tablet Take 15 mg by mouth 3 times daily 90 tablet 0    DULoxetine (CYMBALTA) 30 MG extended release capsule Take 1 capsule by mouth 2 times daily 60 capsule 0    ferrous sulfate (IRON 325) 325 (65 Fe) MG tablet Take 1 tablet by mouth 2 times daily (with meals) 60 tablet 0     Allergies   Allergen Reactions    Latex Hives    Benadryl [Diphenhydramine]     Morphine Anxiety       REVIEW OF SYSTEMS  10 systems reviewed, pertinent positives per HPI otherwise noted to be negative.     PHYSICAL EXAM  /71   Pulse 78   Temp 98.1 °F (36.7 °C) (Oral)   Resp 15   Ht 5' 8\" (1.727 m)   Wt 270 lb (122.5 kg)   SpO2 99%   BMI 41.05 kg/m²   GENERAL APPEARANCE: Awake and alert. Cooperative. No acute distress. HEAD: Normocephalic. Atraumatic. EYES: PERRL. EOM's grossly intact. ENT: Mucous membranes are moist.   NECK: Supple, trachea midline. HEART: RRR. Normal S1, S2. No murmurs, rubs or gallops. LUNGS: Respirations unlabored. CTAB. Good air exchange. No wheezes, rales, or rhonchi. Speaking comfortably in full sentences. ABDOMEN: Soft. Non-distended. Right lower quadrant tenderness to palpation. . No guarding or rebound. Normal Bowel sounds. EXTREMITIES: No peripheral edema. MAEE. No acute deformities. SKIN: Warm and dry. No acute rashes. NEUROLOGICAL: Alert and oriented X 3. CN II-XII intact. No gross facial drooping. Strength 5/5, sensation intact. No pronator drift. Normal coordination. Gait normal.   PSYCHIATRIC: Normal mood and affect. LABS  I have reviewed all labs for this visit.    Results for orders placed or performed during the hospital encounter of 09/15/21   Urinalysis, reflex to microscopic   Result Value Ref Range    Color, UA Yellow Straw/Yellow    Clarity, UA Clear Clear    Glucose, Ur Negative Negative mg/dL    Bilirubin Urine SMALL (A) Negative    Ketones, Urine TRACE (A) Negative mg/dL    Specific Gravity, UA >=1.030 1.005 - 1.030    Blood, Urine Negative Negative    pH, UA 6.5 5.0 - 8.0    Protein, UA TRACE (A) Negative mg/dL    Urobilinogen, Urine 1.0 <2.0 E.U./dL    Nitrite, Urine Negative Negative    Leukocyte Esterase, Urine Negative Negative    Microscopic Examination YES     Urine Type NotGiven    Pregnancy, Urine   Result Value Ref Range    HCG(Urine) Pregnancy Test Negative Detects HCG level >20 MIU/mL   CBC auto differential   Result Value Ref Range    WBC 9.3 4.0 - 11.0 K/uL    RBC 4.32 4.00 - 5.20 M/uL    Hemoglobin 13.0 12.0 - 16.0 g/dL    Hematocrit 38.6 36.0 - 48.0 %    MCV 89.4 80.0 - 100.0 fL    MCH 30.1 26.0 - 34.0 pg    MCHC 33.6 31.0 - 36.0 g/dL    RDW 13.0 12.4 - 15.4 %    Platelets 494 405 - 338 K/uL    MPV 8.6 5.0 - 10.5 fL    Neutrophils % 58.4 %    Lymphocytes % 34.7 %    Monocytes % 5.2 %    Eosinophils % 1.5 %    Basophils % 0.2 %    Neutrophils Absolute 5.5 1.7 - 7.7 K/uL    Lymphocytes Absolute 3.2 1.0 - 5.1 K/uL    Monocytes Absolute 0.5 0.0 - 1.3 K/uL    Eosinophils Absolute 0.1 0.0 - 0.6 K/uL    Basophils Absolute 0.0 0.0 - 0.2 K/uL   Comprehensive Metabolic Panel w/ Reflex to MG   Result Value Ref Range    Sodium 137 136 - 145 mmol/L    Potassium reflex Magnesium 4.2 3.5 - 5.1 mmol/L    Chloride 106 99 - 110 mmol/L    CO2 20 (L) 21 - 32 mmol/L    Anion Gap 11 3 - 16    Glucose 88 70 - 99 mg/dL    BUN 10 7 - 20 mg/dL    CREATININE <0.5 (L) 0.6 - 1.1 mg/dL    GFR Non-African American >60 >60    GFR African American >60 >60    Calcium 9.2 8.3 - 10.6 mg/dL    Total Protein 7.1 6.4 - 8.2 g/dL    Albumin 4.2 3.4 - 5.0 g/dL    Albumin/Globulin Ratio 1.4 1.1 - 2.2    Total Bilirubin 0.3 0.0 - 1.0 mg/dL    Alkaline Phosphatase 70 40 - 129 U/L    ALT 9 (L) 10 - 40 U/L    AST 18 15 - 37 U/L    Globulin 2.9 g/dL           RADIOLOGY  X-RAYS:  I have reviewed radiologic plain film image(s). ALL OTHER NON-PLAIN FILM IMAGES SUCH AS CT, ULTRASOUND AND MRI HAVE BEEN READ BY THE RADIOLOGIST. CT ABDOMEN PELVIS WO CONTRAST Additional Contrast? None   Final Result   No acute intra-abdominal or pelvic abnormality. No evidence of urinary tract   calculus or obstruction                    Rechecks: Physical assessment performed. 2130: Pain 1/10. ED COURSE/MDM  Patient seen and evaluated. Old records reviewed. Labs and imaging reviewed and results discussed with patient. Patient was given Toradol in the ED with good symptomatic relief. Patient was reassessed as noted above . No acute pathology was noted and pt is safe for discharge home to follow up with PCP. Patient's labs and imaging are stable. No evidence of UTI or ureterolithiasis. CT imaging is stable. Given patient's severe onset of symptoms without clear etiology, I discussed risk of potential ovarian torsion. Shared decision making: Risks/benefits of imaging versus nondiagnoses were discussed. Patient does not wish to proceed with imaging at this time and agrees to return to the emergency department for any worsening symptoms. . Plan of care discussed with patient and family. Patient and family in agreement with plan. Patient was given scripts for the following medications. I counseled patient how to take these medications. Discharge Medication List as of 9/15/2021  9:57 PM      START taking these medications    Details   ibuprofen (IBU) 600 MG tablet Take 1 tablet by mouth every 6 hours as needed for Pain, Disp-40 tablet, R-0Normal      !! ondansetron (ZOFRAN ODT) 4 MG disintegrating tablet Take 1 tablet by mouth every 8 hours as needed for Nausea or Vomiting, Disp-15 tablet, R-0Normal       !! - Potential duplicate medications found. Please discuss with provider. CLINICAL IMPRESSION  1. Flank pain    2. History of kidney stones        Blood pressure 129/71, pulse 78, temperature 98.1 °F (36.7 °C), temperature source Oral, resp. rate 15, height 5' 8\" (1.727 m), weight 270 lb (122.5 kg), SpO2 99 %, not currently breastfeeding. Luann Coleman was discharged to home in stable condition.         Nell Walker DO  09/15/21 [id number]

## 2021-11-19 PROCEDURE — 99284 EMERGENCY DEPT VISIT MOD MDM: CPT

## 2021-11-19 PROCEDURE — 96375 TX/PRO/DX INJ NEW DRUG ADDON: CPT

## 2021-11-19 PROCEDURE — 96374 THER/PROPH/DIAG INJ IV PUSH: CPT

## 2021-11-20 ENCOUNTER — APPOINTMENT (OUTPATIENT)
Dept: CT IMAGING | Age: 21
End: 2021-11-20
Payer: COMMERCIAL

## 2021-11-20 ENCOUNTER — HOSPITAL ENCOUNTER (EMERGENCY)
Age: 21
Discharge: HOME | End: 2021-11-20
Attending: EMERGENCY MEDICINE
Payer: COMMERCIAL

## 2021-11-20 VITALS
RESPIRATION RATE: 20 BRPM | DIASTOLIC BLOOD PRESSURE: 71 MMHG | WEIGHT: 280 LBS | TEMPERATURE: 97.8 F | OXYGEN SATURATION: 99 % | BODY MASS INDEX: 42.44 KG/M2 | SYSTOLIC BLOOD PRESSURE: 138 MMHG | HEIGHT: 68 IN | HEART RATE: 92 BPM

## 2021-11-20 DIAGNOSIS — R10.9 RIGHT FLANK PAIN: Primary | ICD-10-CM

## 2021-11-20 LAB
A/G RATIO: 1.4 (ref 1.1–2.2)
ALBUMIN SERPL-MCNC: 3.8 G/DL (ref 3.4–5)
ALP BLD-CCNC: 64 U/L (ref 40–129)
ALT SERPL-CCNC: 6 U/L (ref 10–40)
AMORPHOUS: ABNORMAL /HPF
ANION GAP SERPL CALCULATED.3IONS-SCNC: 9 MMOL/L (ref 3–16)
AST SERPL-CCNC: 13 U/L (ref 15–37)
BACTERIA: ABNORMAL /HPF
BASOPHILS ABSOLUTE: 0.1 K/UL (ref 0–0.2)
BASOPHILS RELATIVE PERCENT: 1 %
BILIRUB SERPL-MCNC: <0.2 MG/DL (ref 0–1)
BILIRUBIN URINE: ABNORMAL
BLOOD, URINE: ABNORMAL
BUN BLDV-MCNC: 9 MG/DL (ref 7–20)
CALCIUM SERPL-MCNC: 8.9 MG/DL (ref 8.3–10.6)
CHLORIDE BLD-SCNC: 103 MMOL/L (ref 99–110)
CLARITY: ABNORMAL
CO2: 24 MMOL/L (ref 21–32)
COLOR: ABNORMAL
CREAT SERPL-MCNC: <0.5 MG/DL (ref 0.6–1.1)
EOSINOPHILS ABSOLUTE: 0.2 K/UL (ref 0–0.6)
EOSINOPHILS RELATIVE PERCENT: 1.7 %
EPITHELIAL CELLS, UA: ABNORMAL /HPF (ref 0–5)
GFR AFRICAN AMERICAN: >60
GFR NON-AFRICAN AMERICAN: >60
GLUCOSE BLD-MCNC: 85 MG/DL (ref 70–99)
GLUCOSE URINE: ABNORMAL MG/DL
HCG QUALITATIVE: NEGATIVE
HCT VFR BLD CALC: 37.3 % (ref 36–48)
HEMOGLOBIN: 12.6 G/DL (ref 12–16)
KETONES, URINE: ABNORMAL MG/DL
LEUKOCYTE ESTERASE, URINE: ABNORMAL
LIPASE: 29 U/L (ref 13–60)
LYMPHOCYTES ABSOLUTE: 5 K/UL (ref 1–5.1)
LYMPHOCYTES RELATIVE PERCENT: 43 %
MCH RBC QN AUTO: 30.1 PG (ref 26–34)
MCHC RBC AUTO-ENTMCNC: 33.7 G/DL (ref 31–36)
MCV RBC AUTO: 89.3 FL (ref 80–100)
MICROSCOPIC EXAMINATION: YES
MONOCYTES ABSOLUTE: 0.6 K/UL (ref 0–1.3)
MONOCYTES RELATIVE PERCENT: 5.3 %
MUCUS: ABNORMAL /LPF
NEUTROPHILS ABSOLUTE: 5.7 K/UL (ref 1.7–7.7)
NEUTROPHILS RELATIVE PERCENT: 49 %
NITRITE, URINE: ABNORMAL
PDW BLD-RTO: 12.7 % (ref 12.4–15.4)
PH UA: ABNORMAL (ref 5–8)
PLATELET # BLD: 219 K/UL (ref 135–450)
PMV BLD AUTO: 8.4 FL (ref 5–10.5)
POTASSIUM REFLEX MAGNESIUM: 4 MMOL/L (ref 3.5–5.1)
PROTEIN UA: ABNORMAL MG/DL
RBC # BLD: 4.17 M/UL (ref 4–5.2)
RBC UA: ABNORMAL /HPF (ref 0–4)
SODIUM BLD-SCNC: 136 MMOL/L (ref 136–145)
SPECIFIC GRAVITY UA: ABNORMAL (ref 1–1.03)
TOTAL PROTEIN: 6.5 G/DL (ref 6.4–8.2)
URINE REFLEX TO CULTURE: ABNORMAL
URINE TYPE: ABNORMAL
UROBILINOGEN, URINE: ABNORMAL E.U./DL
WBC # BLD: 11.6 K/UL (ref 4–11)
WBC UA: ABNORMAL /HPF (ref 0–5)

## 2021-11-20 PROCEDURE — 74177 CT ABD & PELVIS W/CONTRAST: CPT

## 2021-11-20 PROCEDURE — 80053 COMPREHEN METABOLIC PANEL: CPT

## 2021-11-20 PROCEDURE — 83690 ASSAY OF LIPASE: CPT

## 2021-11-20 PROCEDURE — 6360000004 HC RX CONTRAST MEDICATION: Performed by: EMERGENCY MEDICINE

## 2021-11-20 PROCEDURE — 84703 CHORIONIC GONADOTROPIN ASSAY: CPT

## 2021-11-20 PROCEDURE — 81001 URINALYSIS AUTO W/SCOPE: CPT

## 2021-11-20 PROCEDURE — 2580000003 HC RX 258: Performed by: EMERGENCY MEDICINE

## 2021-11-20 PROCEDURE — 85025 COMPLETE CBC W/AUTO DIFF WBC: CPT

## 2021-11-20 PROCEDURE — 6360000002 HC RX W HCPCS: Performed by: EMERGENCY MEDICINE

## 2021-11-20 RX ORDER — ONDANSETRON 2 MG/ML
4 INJECTION INTRAMUSCULAR; INTRAVENOUS ONCE
Status: COMPLETED | OUTPATIENT
Start: 2021-11-20 | End: 2021-11-20

## 2021-11-20 RX ORDER — KETOROLAC TROMETHAMINE 30 MG/ML
15 INJECTION, SOLUTION INTRAMUSCULAR; INTRAVENOUS ONCE
Status: COMPLETED | OUTPATIENT
Start: 2021-11-20 | End: 2021-11-20

## 2021-11-20 RX ORDER — SODIUM CHLORIDE, SODIUM LACTATE, POTASSIUM CHLORIDE, AND CALCIUM CHLORIDE .6; .31; .03; .02 G/100ML; G/100ML; G/100ML; G/100ML
1000 INJECTION, SOLUTION INTRAVENOUS ONCE
Status: COMPLETED | OUTPATIENT
Start: 2021-11-20 | End: 2021-11-20

## 2021-11-20 RX ADMIN — IOPAMIDOL 75 ML: 755 INJECTION, SOLUTION INTRAVENOUS at 01:41

## 2021-11-20 RX ADMIN — KETOROLAC TROMETHAMINE 15 MG: 30 INJECTION, SOLUTION INTRAMUSCULAR; INTRAVENOUS at 00:41

## 2021-11-20 RX ADMIN — ONDANSETRON HYDROCHLORIDE 4 MG: 2 INJECTION, SOLUTION INTRAMUSCULAR; INTRAVENOUS at 00:41

## 2021-11-20 RX ADMIN — SODIUM CHLORIDE, POTASSIUM CHLORIDE, SODIUM LACTATE AND CALCIUM CHLORIDE 1000 ML: 600; 310; 30; 20 INJECTION, SOLUTION INTRAVENOUS at 00:42

## 2021-11-20 ASSESSMENT — PAIN DESCRIPTION - LOCATION: LOCATION: FLANK

## 2021-11-20 ASSESSMENT — PAIN DESCRIPTION - PAIN TYPE: TYPE: ACUTE PAIN

## 2021-11-20 ASSESSMENT — PAIN DESCRIPTION - FREQUENCY: FREQUENCY: CONTINUOUS

## 2021-11-20 ASSESSMENT — PAIN SCALES - GENERAL
PAINLEVEL_OUTOF10: 5
PAINLEVEL_OUTOF10: 8
PAINLEVEL_OUTOF10: 7

## 2021-11-20 ASSESSMENT — PAIN DESCRIPTION - ORIENTATION: ORIENTATION: RIGHT

## 2021-11-20 ASSESSMENT — PAIN DESCRIPTION - DESCRIPTORS: DESCRIPTORS: ACHING

## 2021-11-20 NOTE — ED PROVIDER NOTES
CHIEF COMPLAINT  Flank Pain (right lower flank pain and lower back pain; since 3pm, diarrhea.)      HISTORY OF PRESENT ILLNESS  Zoltan Cardoza is a 24 y.o. female with a history of depression, obesity, nephrolithiasis who presents emergency department for evaluation of right-sided flank pain. Patient states that she has history of kidney stones. She states that this feels like prior episodes. She states that she has had right lower flank pain, back pain since 3 PM.  She states it associated with nausea. Denies vomiting. Denies upper abdominal pain. She states that she took some ibuprofen several hours ago. She denies any fevers, burning with urination, dysuria. She states that she is concerned for potential urinary tract infection. She has not required urologic interventions for prior kidney stones. She denies prior abdominal surgeries. No other complaints, modifying factors or associated symptoms.      Past Medical History:   Diagnosis Date    Depression     Lissa-Danlos syndrome      Past Surgical History:   Procedure Laterality Date    BREAST REDUCTION SURGERY Bilateral 2017    BREAST REDUCTION SURGERY       Family History   Problem Relation Age of Onset    Mental Illness Mother     Other Mother     Mental Illness Maternal Cousin      Social History     Socioeconomic History    Marital status: Single     Spouse name: Not on file    Number of children: 0    Years of education: 15    Highest education level: Not on file   Occupational History    Not on file   Tobacco Use    Smoking status: Never Smoker    Smokeless tobacco: Never Used   Vaping Use    Vaping Use: Never used   Substance and Sexual Activity    Alcohol use: No    Drug use: Yes     Types: Marijuana Jessy Batres)     Comment: daily     Sexual activity: Yes     Partners: Male   Other Topics Concern    Not on file   Social History Narrative    Not on file     Social Determinants of Health     Financial Resource Strain:    [employer] Difficulty of Paying Living Expenses: Not on file   Food Insecurity:     Worried About Running Out of Food in the Last Year: Not on file    Ran Out of Food in the Last Year: Not on file   Transportation Needs:     Lack of Transportation (Medical): Not on file    Lack of Transportation (Non-Medical): Not on file   Physical Activity:     Days of Exercise per Week: Not on file    Minutes of Exercise per Session: Not on file   Stress:     Feeling of Stress : Not on file   Social Connections:     Frequency of Communication with Friends and Family: Not on file    Frequency of Social Gatherings with Friends and Family: Not on file    Attends [employer]oration[employer] Services: Not on file    Active Member of [address] [place] [address] [employer] or Organizations: Not on file    Attends Club or Organization Meetings: Not on file    Marital Status: Not on file   Intimate Partner Violence:     Fear of Current or Ex-Partner: Not on file    Emotionally Abused: Not on file    Physically Abused: Not on file    Sexually Abused: Not on file   Housing Stability:     Unable to Pay for Housing in the Last Year: Not on file    Number of [place] in the Last Year: Not on file    Unstable Housing in the Last Year: Not on file     No current facility-administered medications for this encounter.      Current Outpatient Medications   Medication Sig Dispense Refill    NONFORMULARY Birth  Control Pill      ibuprofen (IBU) 600 MG tablet Take 1 tablet by mouth every 6 hours as needed for Pain 40 tablet 0    ondansetron (ZOFRAN ODT) 4 MG disintegrating tablet Take 1 tablet by mouth every 8 hours as needed for Nausea or Vomiting 15 tablet 0    ondansetron (ZOFRAN ODT) 4 MG disintegrating tablet Take 1 tablet by mouth every 8 hours as needed for Nausea 20 tablet 0    QUEtiapine Fumarate (SEROQUEL PO) Take by mouth      busPIRone (BUSPAR) 15 MG tablet Take 15 mg by mouth 3 times daily 90 tablet 0    DULoxetine (CYMBALTA) 30 MG extended release capsule Take 1 capsule by mouth 2 times daily 60 capsule 0    ferrous sulfate (IRON 325) 325 (65 Fe) MG tablet Take 1 tablet by mouth 2 times daily (with meals) 60 tablet 0     Allergies   Allergen Reactions    Latex Hives    Benadryl [Diphenhydramine]     Morphine Anxiety       REVIEW OF SYSTEMS  Positive and pertinent negatives as per HPI. All other systems were reviewed and are negative. PHYSICAL EXAM  /71   Pulse 92   Temp 97.8 °F (36.6 °C) (Oral)   Resp 20   Ht 5' 8\" (1.727 m)   Wt 280 lb (127 kg)   LMP 10/27/2021   SpO2 99%   BMI 42.57 kg/m²   GENERAL APPEARANCE: Awake and alert. Cooperative. HEAD: Normocephalic. Atraumatic. EYES: PERRL. EOM's grossly intact. HEART: RRR. No harsh murmurs. Intact radial pulses 2+ bilaterally. LUNGS: Respirations unlabored without accessory muscle use. Speaking comfortably in full sentences. ABDOMEN: Soft. Non-distended. Non-tender. No guarding or rebound. There is no CVA tenderness. There is mild suprapubic tenderness, mild right lower quadrant tenderness  EXTREMITIES: No peripheral edema. No acute deformities. SKIN: Warm and dry. No acute rashes. NEUROLOGICAL: Alert and oriented X 3. No focal deficits  PSYCHIATRIC: Normal mood and affect. LABS  I have reviewed all labs for this visit.    Results for orders placed or performed during the hospital encounter of 11/20/21   Urinalysis Reflex to Culture    Specimen: Urine, clean catch   Result Value Ref Range    Color, UA ORANGE (A) Straw/Yellow    Clarity, UA SL CLOUDY (A) Clear    Glucose, Ur Color Interfere Negative mg/dL    Bilirubin Urine Color Interfere Negative    Ketones, Urine Color Interfere Negative mg/dL    Specific Gravity, UA Color Interfere 1.005 - 1.030    Blood, Urine Color Interfere Negative    pH, UA Color Interfere 5.0 - 8.0    Protein, UA Color Interfere Negative mg/dL    Urobilinogen, Urine Color Interfere <2.0 E.U./dL    Nitrite, Urine Color Interfere Negative    Leukocyte Esterase, Urine Color Interfere Negative    Microscopic Examination YES     Urine Type NotGiven     Urine Reflex to Culture Not Indicated    CBC Auto Differential   Result Value Ref Range    WBC 11.6 (H) 4.0 - 11.0 K/uL    RBC 4.17 4.00 - 5.20 M/uL    Hemoglobin 12.6 12.0 - 16.0 g/dL    Hematocrit 37.3 36.0 - 48.0 %    MCV 89.3 80.0 - 100.0 fL    MCH 30.1 26.0 - 34.0 pg    MCHC 33.7 31.0 - 36.0 g/dL    RDW 12.7 12.4 - 15.4 %    Platelets 088 961 - 123 K/uL    MPV 8.4 5.0 - 10.5 fL    Neutrophils % 49.0 %    Lymphocytes % 43.0 %    Monocytes % 5.3 %    Eosinophils % 1.7 %    Basophils % 1.0 %    Neutrophils Absolute 5.7 1.7 - 7.7 K/uL    Lymphocytes Absolute 5.0 1.0 - 5.1 K/uL    Monocytes Absolute 0.6 0.0 - 1.3 K/uL    Eosinophils Absolute 0.2 0.0 - 0.6 K/uL    Basophils Absolute 0.1 0.0 - 0.2 K/uL   Comprehensive Metabolic Panel w/ Reflex to MG   Result Value Ref Range    Sodium 136 136 - 145 mmol/L    Potassium reflex Magnesium 4.0 3.5 - 5.1 mmol/L    Chloride 103 99 - 110 mmol/L    CO2 24 21 - 32 mmol/L    Anion Gap 9 3 - 16    Glucose 85 70 - 99 mg/dL    BUN 9 7 - 20 mg/dL    CREATININE <0.5 (L) 0.6 - 1.1 mg/dL    GFR Non-African American >60 >60    GFR African American >60 >60    Calcium 8.9 8.3 - 10.6 mg/dL    Total Protein 6.5 6.4 - 8.2 g/dL    Albumin 3.8 3.4 - 5.0 g/dL    Albumin/Globulin Ratio 1.4 1.1 - 2.2    Total Bilirubin <0.2 0.0 - 1.0 mg/dL    Alkaline Phosphatase 64 40 - 129 U/L    ALT 6 (L) 10 - 40 U/L    AST 13 (L) 15 - 37 U/L   Lipase   Result Value Ref Range    Lipase 29.0 13.0 - 60.0 U/L   HCG Qualitative, Serum   Result Value Ref Range    hCG Qual Negative Detects HCG level >10 MIU/mL   Microscopic Urinalysis   Result Value Ref Range    Mucus, UA 1+ (A) None Seen /LPF    WBC, UA 0-2 0 - 5 /HPF    RBC, UA 3-4 0 - 4 /HPF    Epithelial Cells, UA 21-50 (A) 0 - 5 /HPF    Bacteria, UA 2+ (A) None Seen /HPF    Amorphous, UA 2+ /HPF         RADIOLOGY  X-RAYS:  I have reviewed radiologic plain film image(s).   ALL OTHER NON-PLAIN FILM IMAGES SUCH AS CT, ULTRASOUND AND MRI HAVE BEEN READ BY THE RADIOLOGIST. CT ABDOMEN PELVIS W IV CONTRAST Additional Contrast? None   Final Result   No acute process identified. ED COURSE/MDM  This is a 70-year-old female who presents for evaluation of right flank pain with history of kidney stones. Patient arrives with stable vital signs on exam, no acute distress, appears nontoxic. No significant abdominal pain. There is no CVA tenderness. ED evaluation include basic labs, CT abdomen pelvis with IV contrast.  Patient will receive IV Toradol, fluids, Zofran for management of symptoms while diagnostic work-up is pending. Labs reveal mild leukocytosis 11.6. Renal function panel is within normal limits. NC screen is negative. Urinalysis reveals 21-50 epithelial cells, negative for leukocyte esterase. 0-2 white blood cells. CT abdomen pelvis reveals no acute process. The exact etiology of the patient symptoms is unclear at this time. There does not appear to be findings concerning for appendicitis, complication from renal stone, findings concerning for ovarian torsion. Patient appears improved on reevaluation continues to have a benign abdominal exam.  She was advised to follow-up with her primary care doctor. The patient will be discharged from the emergency department. The patient was counseled on their diagnosis and any medications prescribed. They were advised on the need for PCP followup. They were counseled on the need to return to the emergency department if any of their symptoms were to worsen, change or have any other concerns. Discharged in stable condition. CLINICAL IMPRESSION  1. Right flank pain        Blood pressure 138/71, pulse 92, temperature 97.8 °F (36.6 °C), temperature source Oral, resp.  rate 20, height 5' 8\" (1.727 m), weight 280 lb (127 kg), last menstrual period 10/27/2021, SpO2 99 %, not currently breastfeeding. Zurdo Gale was discharged to home in stable condition. This chart was generated in part by using Dragon Dictation system and may contain errors related to that system including errors in grammar, punctuation, and spelling, as well as words and phrases that may be inappropriate. If there are any questions or concerns please feel free to contact the dictating provider for clarification.      Darian Whittaker MD  11/20/21 9155

## 2022-12-03 ENCOUNTER — HOSPITAL ENCOUNTER (EMERGENCY)
Age: 22
Discharge: HOME | End: 2022-12-03
Payer: COMMERCIAL

## 2022-12-03 ENCOUNTER — APPOINTMENT (OUTPATIENT)
Dept: CT IMAGING | Age: 22
End: 2022-12-03
Payer: COMMERCIAL

## 2022-12-03 VITALS
TEMPERATURE: 97.3 F | OXYGEN SATURATION: 97 % | SYSTOLIC BLOOD PRESSURE: 109 MMHG | DIASTOLIC BLOOD PRESSURE: 64 MMHG | WEIGHT: 293 LBS | RESPIRATION RATE: 15 BRPM | HEIGHT: 68 IN | HEART RATE: 69 BPM | BODY MASS INDEX: 44.41 KG/M2

## 2022-12-03 DIAGNOSIS — R19.7 DIARRHEA, UNSPECIFIED TYPE: ICD-10-CM

## 2022-12-03 DIAGNOSIS — N23 RENAL COLIC: ICD-10-CM

## 2022-12-03 DIAGNOSIS — R10.9 FLANK PAIN: Primary | ICD-10-CM

## 2022-12-03 DIAGNOSIS — R11.0 NAUSEA: ICD-10-CM

## 2022-12-03 LAB
A/G RATIO: 1.5 (ref 1.1–2.2)
ALBUMIN SERPL-MCNC: 4.1 G/DL (ref 3.4–5)
ALP BLD-CCNC: 77 U/L (ref 40–129)
ALT SERPL-CCNC: 7 U/L (ref 10–40)
ANION GAP SERPL CALCULATED.3IONS-SCNC: 9 MMOL/L (ref 3–16)
AST SERPL-CCNC: 12 U/L (ref 15–37)
BASOPHILS ABSOLUTE: 0.1 K/UL (ref 0–0.2)
BASOPHILS RELATIVE PERCENT: 0.9 %
BILIRUB SERPL-MCNC: 0.3 MG/DL (ref 0–1)
BILIRUBIN URINE: NEGATIVE
BLOOD, URINE: NEGATIVE
BUN BLDV-MCNC: 9 MG/DL (ref 7–20)
CALCIUM SERPL-MCNC: 8.8 MG/DL (ref 8.3–10.6)
CHLORIDE BLD-SCNC: 107 MMOL/L (ref 99–110)
CLARITY: CLEAR
CO2: 23 MMOL/L (ref 21–32)
COLOR: YELLOW
CREAT SERPL-MCNC: 0.6 MG/DL (ref 0.6–1.1)
EOSINOPHILS ABSOLUTE: 0.2 K/UL (ref 0–0.6)
EOSINOPHILS RELATIVE PERCENT: 1.8 %
GFR SERPL CREATININE-BSD FRML MDRD: >60 ML/MIN/{1.73_M2}
GLUCOSE BLD-MCNC: 74 MG/DL (ref 70–99)
GLUCOSE URINE: NEGATIVE MG/DL
HCG QUALITATIVE: NEGATIVE
HCT VFR BLD CALC: 37.8 % (ref 36–48)
HEMOGLOBIN: 12.7 G/DL (ref 12–16)
KETONES, URINE: NEGATIVE MG/DL
LEUKOCYTE ESTERASE, URINE: NEGATIVE
LIPASE: 16 U/L (ref 13–60)
LYMPHOCYTES ABSOLUTE: 2.7 K/UL (ref 1–5.1)
LYMPHOCYTES RELATIVE PERCENT: 28.5 %
MCH RBC QN AUTO: 29.1 PG (ref 26–34)
MCHC RBC AUTO-ENTMCNC: 33.7 G/DL (ref 31–36)
MCV RBC AUTO: 86.3 FL (ref 80–100)
MICROSCOPIC EXAMINATION: NORMAL
MONOCYTES ABSOLUTE: 0.4 K/UL (ref 0–1.3)
MONOCYTES RELATIVE PERCENT: 4.7 %
NEUTROPHILS ABSOLUTE: 6.1 K/UL (ref 1.7–7.7)
NEUTROPHILS RELATIVE PERCENT: 64.1 %
NITRITE, URINE: NEGATIVE
PDW BLD-RTO: 13.6 % (ref 12.4–15.4)
PH UA: 5.5 (ref 5–8)
PLATELET # BLD: 253 K/UL (ref 135–450)
PMV BLD AUTO: 8.3 FL (ref 5–10.5)
POTASSIUM REFLEX MAGNESIUM: 3.9 MMOL/L (ref 3.5–5.1)
PROTEIN UA: NEGATIVE MG/DL
RBC # BLD: 4.38 M/UL (ref 4–5.2)
SODIUM BLD-SCNC: 139 MMOL/L (ref 136–145)
SPECIFIC GRAVITY UA: >=1.03 (ref 1–1.03)
TOTAL PROTEIN: 6.9 G/DL (ref 6.4–8.2)
URINE REFLEX TO CULTURE: NORMAL
URINE TYPE: NORMAL
UROBILINOGEN, URINE: 0.2 E.U./DL
WBC # BLD: 9.5 K/UL (ref 4–11)

## 2022-12-03 PROCEDURE — 74177 CT ABD & PELVIS W/CONTRAST: CPT

## 2022-12-03 PROCEDURE — 96374 THER/PROPH/DIAG INJ IV PUSH: CPT | Performed by: NURSE PRACTITIONER

## 2022-12-03 PROCEDURE — 99285 EMERGENCY DEPT VISIT HI MDM: CPT | Performed by: NURSE PRACTITIONER

## 2022-12-03 PROCEDURE — 6360000004 HC RX CONTRAST MEDICATION: Performed by: NURSE PRACTITIONER

## 2022-12-03 PROCEDURE — 83690 ASSAY OF LIPASE: CPT

## 2022-12-03 PROCEDURE — 80053 COMPREHEN METABOLIC PANEL: CPT

## 2022-12-03 PROCEDURE — 6360000002 HC RX W HCPCS: Performed by: NURSE PRACTITIONER

## 2022-12-03 PROCEDURE — 85025 COMPLETE CBC W/AUTO DIFF WBC: CPT

## 2022-12-03 PROCEDURE — 84703 CHORIONIC GONADOTROPIN ASSAY: CPT

## 2022-12-03 PROCEDURE — 81003 URINALYSIS AUTO W/O SCOPE: CPT

## 2022-12-03 PROCEDURE — 96375 TX/PRO/DX INJ NEW DRUG ADDON: CPT | Performed by: NURSE PRACTITIONER

## 2022-12-03 PROCEDURE — 36415 COLL VENOUS BLD VENIPUNCTURE: CPT

## 2022-12-03 RX ORDER — NAPROXEN 250 MG/1
250 TABLET ORAL 2 TIMES DAILY WITH MEALS
Qty: 20 TABLET | Refills: 1 | Status: SHIPPED | OUTPATIENT
Start: 2022-12-03

## 2022-12-03 RX ORDER — ONDANSETRON 2 MG/ML
4 INJECTION INTRAMUSCULAR; INTRAVENOUS ONCE
Status: COMPLETED | OUTPATIENT
Start: 2022-12-03 | End: 2022-12-03

## 2022-12-03 RX ORDER — TRAZODONE HYDROCHLORIDE 50 MG/1
50 TABLET ORAL NIGHTLY
COMMUNITY
Start: 2022-11-29

## 2022-12-03 RX ORDER — OXCARBAZEPINE 600 MG/1
600 TABLET, FILM COATED ORAL NIGHTLY
COMMUNITY
Start: 2022-11-29

## 2022-12-03 RX ORDER — ONDANSETRON 4 MG/1
4 TABLET, FILM COATED ORAL EVERY 8 HOURS PRN
Qty: 20 TABLET | Refills: 0 | Status: SHIPPED | OUTPATIENT
Start: 2022-12-03

## 2022-12-03 RX ORDER — ETONOGESTREL AND ETHINYL ESTRADIOL 11.7; 2.7 MG/1; MG/1
1 INSERT, EXTENDED RELEASE VAGINAL SEE ADMIN INSTRUCTIONS
COMMUNITY

## 2022-12-03 RX ORDER — OXCARBAZEPINE 300 MG/1
300 TABLET, FILM COATED ORAL DAILY
COMMUNITY
Start: 2022-11-29

## 2022-12-03 RX ORDER — ESCITALOPRAM OXALATE 20 MG/1
20 TABLET ORAL NIGHTLY
COMMUNITY
Start: 2022-11-01

## 2022-12-03 RX ORDER — KETOROLAC TROMETHAMINE 30 MG/ML
15 INJECTION, SOLUTION INTRAMUSCULAR; INTRAVENOUS ONCE
Status: COMPLETED | OUTPATIENT
Start: 2022-12-03 | End: 2022-12-03

## 2022-12-03 RX ADMIN — KETOROLAC TROMETHAMINE 15 MG: 30 INJECTION, SOLUTION INTRAMUSCULAR; INTRAVENOUS at 16:08

## 2022-12-03 RX ADMIN — ONDANSETRON HYDROCHLORIDE 4 MG: 2 INJECTION, SOLUTION INTRAMUSCULAR; INTRAVENOUS at 16:09

## 2022-12-03 RX ADMIN — IOPAMIDOL 75 ML: 755 INJECTION, SOLUTION INTRAVENOUS at 17:01

## 2022-12-03 ASSESSMENT — ENCOUNTER SYMPTOMS
FACIAL SWELLING: 0
DIARRHEA: 1
ABDOMINAL PAIN: 0
SORE THROAT: 0
VOMITING: 0
COLOR CHANGE: 0
NAUSEA: 1
SHORTNESS OF BREATH: 0
RHINORRHEA: 0

## 2022-12-03 ASSESSMENT — PAIN SCALES - GENERAL
PAINLEVEL_OUTOF10: 8
PAINLEVEL_OUTOF10: 8

## 2022-12-03 ASSESSMENT — PAIN - FUNCTIONAL ASSESSMENT
PAIN_FUNCTIONAL_ASSESSMENT: NONE - DENIES PAIN
PAIN_FUNCTIONAL_ASSESSMENT: 0-10

## 2022-12-03 ASSESSMENT — PAIN DESCRIPTION - LOCATION: LOCATION: FLANK

## 2022-12-03 ASSESSMENT — PAIN DESCRIPTION - ORIENTATION: ORIENTATION: RIGHT

## 2023-01-01 NOTE — ED PROVIDER NOTES
CHIEF COMPLAINT  Back Pain (pt states has had back pain all day, R lower flank into lower abd, denies uriary symptomes, no N/V, has some diarrhea )      HISTORY OF PRESENT ILLNESS  Brendan Puente is a 21 y.o. female who presents to the ED with right lower abdominal pain and lower flank pain on the right side. Denies any urinary issues. She is currently on her menstrual period. Reports some mild diarrhea as well. No headache or dizziness or cough or shortness of breath or chest pain or any numbness or tingling or dizziness. No other complaints, modifying factors or associated symptoms. I have reviewed the following from the nursing documentation. History reviewed. No pertinent past medical history. Past Surgical History:   Procedure Laterality Date    BREAST REDUCTION SURGERY Bilateral 2017     History reviewed. No pertinent family history.   Social History     Socioeconomic History    Marital status: Single     Spouse name: Not on file    Number of children: Not on file    Years of education: Not on file    Highest education level: Not on file   Occupational History    Not on file   Social Needs    Financial resource strain: Not on file    Food insecurity     Worry: Not on file     Inability: Not on file    Transportation needs     Medical: Not on file     Non-medical: Not on file   Tobacco Use    Smoking status: Never Smoker    Smokeless tobacco: Never Used   Substance and Sexual Activity    Alcohol use: No    Drug use: No    Sexual activity: Not on file   Lifestyle    Physical activity     Days per week: Not on file     Minutes per session: Not on file    Stress: Not on file   Relationships    Social connections     Talks on phone: Not on file     Gets together: Not on file     Attends Jew service: Not on file     Active member of club or organization: Not on file     Attends meetings of clubs or organizations: Not on file     Relationship status: Not on file    Intimate NEUROLOGICAL: Alert and oriented. CN 2-12 intact, No gross facial drooping. Strength 5/5, sensation intact. Normal coordination. Gait normal.   PSYCHIATRIC: Normal mood and affect. LABS  I have reviewed all labs for this visit.    Results for orders placed or performed during the hospital encounter of 04/27/20   CBC Auto Differential   Result Value Ref Range    WBC 10.7 4.0 - 11.0 K/uL    RBC 4.62 4.00 - 5.20 M/uL    Hemoglobin 13.6 12.0 - 16.0 g/dL    Hematocrit 41.1 36.0 - 48.0 %    MCV 88.9 80.0 - 100.0 fL    MCH 29.4 26.0 - 34.0 pg    MCHC 33.0 31.0 - 36.0 g/dL    RDW 13.7 12.4 - 15.4 %    Platelets 261 595 - 439 K/uL    MPV 8.3 5.0 - 10.5 fL    Neutrophils % 67.8 %    Lymphocytes % 24.8 %    Monocytes % 6.3 %    Eosinophils % 0.8 %    Basophils % 0.3 %    Neutrophils Absolute 7.2 1.7 - 7.7 K/uL    Lymphocytes Absolute 2.6 1.0 - 5.1 K/uL    Monocytes Absolute 0.7 0.0 - 1.3 K/uL    Eosinophils Absolute 0.1 0.0 - 0.6 K/uL    Basophils Absolute 0.0 0.0 - 0.2 K/uL   Comprehensive Metabolic Panel   Result Value Ref Range    Sodium 137 136 - 145 mmol/L    Potassium 3.6 3.5 - 5.1 mmol/L    Chloride 102 99 - 110 mmol/L    CO2 24 21 - 32 mmol/L    Anion Gap 11 3 - 16    Glucose 100 (H) 70 - 99 mg/dL    BUN 10 7 - 20 mg/dL    CREATININE <0.5 (L) 0.6 - 1.1 mg/dL    GFR Non-African American >60 >60    GFR African American >60 >60    Calcium 9.3 8.3 - 10.6 mg/dL    Total Protein 7.1 6.4 - 8.2 g/dL    Alb 4.1 3.4 - 5.0 g/dL    Albumin/Globulin Ratio 1.4 1.1 - 2.2    Total Bilirubin 0.4 0.0 - 1.0 mg/dL    Alkaline Phosphatase 59 40 - 129 U/L    ALT 7 (L) 10 - 40 U/L    AST 14 (L) 15 - 37 U/L    Globulin 3.0 g/dL   Lipase   Result Value Ref Range    Lipase 20.0 13.0 - 60.0 U/L   HCG Qualitative, Serum   Result Value Ref Range    hCG Qual Negative Detects HCG level >10 MIU/mL   Urinalysis   Result Value Ref Range    Color, UA Yellow Straw/Yellow    Clarity, UA Clear Clear    Glucose, Ur Negative Negative mg/dL    Bilirubin [Normal Development] : Normal Development [None] : none (TYLENOL/CODEINE #3) 300-30 MG per tablet Take 1 tablet by mouth every 4 hours as needed for Pain for up to 5 days. Intended supply: 5 days. Take lowest dose possible to manage pain, Disp-10 tablet, R-0Print      ondansetron (ZOFRAN ODT) 4 MG disintegrating tablet Place 1 tablet under the tongue every 8 hours as needed for Nausea or Vomiting, Disp-10 tablet, R-0Print           CLINICAL IMPRESSION  1. Mesenteric adenitis      Blood pressure 118/78, pulse 102, temperature 98 °F (36.7 °C), temperature source Oral, resp. rate 18, weight 226 lb (102.5 kg), last menstrual period 04/20/2020, SpO2 98 %, not currently breastfeeding. Lyric Mcduffie was discharged to home in stable condition. This chart was generated in part by using Dragon Dictation system and may contain errors related to that system including errors in grammar, punctuation, and spelling, as well as words and phrases that may be inappropriate. When dictating, effort is made to correct spelling/grammar errors. If there are any questions or concerns please feel free to contact the dictating provider for clarification.      Gisella Pearl DO  ATTENDING, EMERGENCY MEDICINE       Aisha Meyers DO  05/03/20 3509 [FreeTextEntry1] : Prone:  Head up to vertical axis, legs extended Supine:  Symmetrical posture dominant,  hands in midline,  grasps objects, brings to mouthSitting/Standing:  No head lag,  head steady, tipped forward upright at 20 wks),   sits with truncal support,  when held erect, pushes with feetManipulation:  regards pellet brieflySocial:  Laughs out loud; active at sight of food,  displeasure if social contact broken\par  [Passed] : passed

## 2023-03-23 NOTE — ED PROVIDER NOTES
Evaluated by Advanced Practice Provider          [facility]  EMERGENCY DEPARTMENT ENCOUNTER        Pt Name: Marivel Crowell  MRN: [id number]  [place] 2000  Dateof evaluation: 12/3/2022  Provider: ALVARO Traore - KIERAN  PCP: Tamar Snow MD  ED Attending: No att. providers found    [facility]       Chief Complaint   Patient presents with    Flank Pain     Pt c/o right sided flank pain and frequent urination       HISTORY OF PRESENTILLNESS   (Location/Symptom, Timing/Onset, Context/Setting, Quality, Duration, Modifying Factors, Severity)  Note limiting factors. Marivel Crowell is a 25 y.o. female for right flank pain. Onset was today. Context includes patient reports that she started having right flank pain today. She states that it wraps around and goes to her right lower quadrant. She denies any fevers but has had nausea and diarrhea. Alleviating factors include nothing. Aggravating factors include nothing. Pain is 8/10. Nothing has been used for pain today. Nursing Notes were all reviewed and agreed with or any disagreements were addressed  in the HPI. REVIEW OF SYSTEMS    (2-9 systems for level 4, 10 or more for level 5)     Review of Systems   Constitutional:  Negative for activity change, appetite change and fever. HENT:  Negative for congestion, facial swelling, rhinorrhea and sore throat. Eyes:  Negative for visual disturbance. Respiratory:  Negative for shortness of breath. Cardiovascular:  Negative for chest pain. Gastrointestinal:  Positive for diarrhea and nausea. Negative for abdominal pain and vomiting. Genitourinary:  Positive for flank pain and hematuria. Negative for decreased urine volume, difficulty urinating, menstrual problem, pelvic pain, vaginal bleeding, vaginal discharge and vaginal pain. Musculoskeletal:  Negative for arthralgias and myalgias. Skin:  Negative for color change and rash.    Neurological:  Negative for Please Approve or Refuse.   Send to Pharmacy per Pt's Request: [facility]      Next Visit Date:  9/5/2023   Last Visit Date: 3/15/2023    Hemoglobin A1C (%)   Date Value   03/15/2023 6.7   12/15/2022 8.0   08/30/2022 7.5             ( goal A1C is < 7)   BP Readings from Last 3 Encounters:   03/15/23 104/86   12/15/22 128/76   09/19/22 110/76          (goal 120/80)  BUN   Date Value Ref Range Status   02/16/2023 14 8 - 23 mg/dL Final     Creatinine   Date Value Ref Range Status   02/16/2023 0.45 (L) 0.50 - 0.90 mg/dL Final     Potassium   Date Value Ref Range Status   02/16/2023 4.4 3.7 - 5.3 mmol/L Final dizziness and light-headedness. Psychiatric/Behavioral:  Negative for agitation. All other systems reviewed and are negative. Positives and Pertinent negatives as per HPI. Except as noted above in the ROS, all other systems were reviewed and negative. PAST MEDICAL HISTORY     Past Medical History:   Diagnosis Date    Depression     Lissa-Danlos syndrome          SURGICAL HISTORY       Past Surgical History:   Procedure Laterality Date    BREAST REDUCTION SURGERY Bilateral 2017    BREAST REDUCTION SURGERY           CURRENT MEDICATIONS       Discharge Medication List as of 12/3/2022  6:56 PM        CONTINUE these medications which have NOT CHANGED    Details   etonogestrel-ethinyl estradiol (NUVARING) 0.12-0.015 MG/24HR vaginal ring Place 1 each vaginally See Admin InstructionsHistorical Med      escitalopram (LEXAPRO) 20 MG tablet Take 20 mg by mouth at bedtimeHistorical Med      !! OXcarbazepine (TRILEPTAL) 300 MG tablet Take 300 mg by mouth dailyHistorical Med      !! OXcarbazepine (TRILEPTAL) 600 MG tablet Take 600 mg by mouth at bedtimeHistorical Med      traZODone (DESYREL) 50 MG tablet Take 50 mg by mouth at bedtimeHistorical Med      ibuprofen (IBU) 600 MG tablet Take 1 tablet by mouth every 6 hours as needed for Pain, Disp-40 tablet, R-0Normal      QUEtiapine Fumarate (SEROQUEL PO) Take by mouthHistorical Med      busPIRone (BUSPAR) 15 MG tablet Take 15 mg by mouth 3 times daily, Disp-90 tablet, R-0Normal      ferrous sulfate (IRON 325) 325 (65 Fe) MG tablet Take 1 tablet by mouth 2 times daily (with meals), Disp-60 tablet, R-0Normal       !! - Potential duplicate medications found. Please discuss with provider.             ALLERGIES     Latex, Benadryl [diphenhydramine], and Morphine    FAMILY HISTORY       Family History   Problem Relation Age of Onset    Mental Illness Mother     Other Mother     Mental Illness Maternal Cousin           SOCIAL HISTORY       Social History     Socioeconomic History    Marital status: Single     Spouse name: None    Number of children: 0    Years of education: 13    Highest education level: None   Tobacco Use    Smoking status: Never    Smokeless tobacco: Never   Vaping Use    Vaping Use: Every day   Substance and Sexual Activity    Alcohol use: No    Drug use: Yes     Types: Marijuana (Weed)     Comment: daily     Sexual activity: Yes     Partners: Male       SCREENINGS    [place] Coma Scale  Eye Opening: Spontaneous  Best Verbal Response: Oriented  Best Motor Response: Obeys commands  [place] Coma Scale Score: 15        PHYSICAL EXAM  (up to 7 for level 4, 8 or more for level 5)     ED Triage Vitals [12/03/22 1544]   BP Temp Temp src Heart Rate Resp SpO2 Height Weight   (!) 143/82 97.3 °F (36.3 °C) -- 95 16 99 % 5' 8\" (1.727 m) (!) 335 lb (152 kg)       Physical Exam  Constitutional:       Appearance: Normal appearance. She is well-developed. She is obese. HENT:      Head: Normocephalic and atraumatic. Cardiovascular:      Rate and Rhythm: Normal rate. Pulmonary:      Effort: Pulmonary effort is normal. No respiratory distress. Abdominal:      General: There is no distension. Palpations: Abdomen is soft. Tenderness: There is no abdominal tenderness. There is right CVA tenderness. Comments: Right flank pain but no focal abdominal tenderness on exam   Musculoskeletal:         General: Normal range of motion. Cervical back: Normal range of motion. Skin:     General: Skin is warm and dry. Neurological:      Mental Status: She is alert and oriented to person, place, and time. DIAGNOSTIC RESULTS   LABS:    Labs Reviewed   COMPREHENSIVE METABOLIC PANEL W/ REFLEX TO MG FOR LOW K - Abnormal; Notable for the following components:       Result Value    ALT 7 (*)     AST 12 (*)     All other components within normal limits   CBC WITH AUTO DIFFERENTIAL   URINALYSIS WITH REFLEX TO CULTURE   HCG, SERUM, QUALITATIVE   LIPASE           EKG:  All EKG's are interpreted by the Emergency Department Physician who either signs or Co-signs this chart in the absence of a cardiologist.  Please see their note for interpretation of EKG. RADIOLOGY:     Abdominal CT with IV contrast interpreted by radiologist for  FINDINGS:   Lower Chest: Mild dependent subsegmental atelectasis. Organs: Normal liver. Normal gallbladder. No evidence of biliary ductal   dilatation. Spleen is normal in size. Normal pancreas. No evidence of ductal   dilatation. Normal adrenal glands. Normal kidneys. No renal calculi or   hydronephrosis. Pelvis: The bladder is decompressed, limiting its evaluation. Normal uterus. GI/Bowel: Normal stomach and bowel. Appendix is normal.     Peritoneum/Retroperitoneum:No free fluid, free air, organized fluid   collection or lymphadenopathy. Vasculature is unremarkable. Soft tissues: Normal.     Bones: Normal.                     Interpretation per the Radiologist below, if available at the time of this note:    CT ABDOMEN PELVIS W IV CONTRAST Additional Contrast? None   Final Result   No acute abnormality of the abdomen or pelvis. CT ABDOMEN PELVIS W IV CONTRAST Additional Contrast? None    Result Date: 12/3/2022  EXAMINATION: CT OF THE ABDOMEN AND PELVIS WITH CONTRAST 12/3/2022 4:59 pm TECHNIQUE: CT of the abdomen and pelvis was performed with the administration of intravenous contrast. Multiplanar reformatted images are provided for review. Automated exposure control, iterative reconstruction, and/or weight based adjustment of the mA/kV was utilized to reduce the radiation dose to as low as reasonably achievable.  COMPARISON: 11/20/2021 and prior HISTORY: ORDERING SYSTEM PROVIDED HISTORY: right flank pain that radiate to rlq TECHNOLOGIST PROVIDED HISTORY: Additional Contrast?->None Reason for exam:->right flank pain that radiate to rlq Decision Support Exception - unselect if not a suspected or confirmed emergency medical condition->Emergency Medical Condition (MA) Reason for Exam: right flank pain radiates to RLQ started at noon today . pt does have h/o stones FINDINGS: Lower Chest: Mild dependent subsegmental atelectasis. Organs: Normal liver. Normal gallbladder. No evidence of biliary ductal dilatation. Spleen is normal in size. Normal pancreas. No evidence of ductal dilatation. Normal adrenal glands. Normal kidneys. No renal calculi or hydronephrosis. Pelvis: The bladder is decompressed, limiting its evaluation. Normal uterus. GI/Bowel: Normal stomach and bowel. Appendix is normal. Peritoneum/Retroperitoneum:No free fluid, free air, organized fluid collection or lymphadenopathy. Vasculature is unremarkable. Soft tissues: Normal. Bones: Normal.     No acute abnormality of the abdomen or pelvis. No results found. PROCEDURES   Unless otherwise noted below, none     Procedures           EMERGENCYDEPARTMENT COURSE and DIFFERENTIAL DIAGNOSIS/MDM:   Vitals:    Vitals:    12/03/22 1544 12/03/22 1910   BP: (!) 143/82 109/64   Pulse: 95 69   Resp: 16 15   Temp: 97.3 °F (36.3 °C)    SpO2: 99% 97%   Weight: (!) 335 lb (152 kg)    Height: 5' 8\" (1.727 m)        Patient was given the following medications:  Medications   ketorolac (TORADOL) injection 15 mg (15 mg IntraVENous Given 12/3/22 1608)   ondansetron (ZOFRAN) injection 4 mg (4 mg IntraVENous Given 12/3/22 1609)   iopamidol (ISOVUE-370) 76 % injection 75 mL (75 mLs IntraVENous Given 12/3/22 1701)       Patient was seen and evaluated by myself. Patient here for complaints of right-sided flank pain. Patient states that she has had flank pain for the last day. She states that it kind of radiates around to the side and occasionally goes to the right lower quadrant. Patient states that she has had kidney stones in the past and this feels similar. She denies any fever but states she has had nausea and some diarrhea.   On exam she is awake and alert hemodynamically stable nontoxic in appearance. Patient was given Toradol and Zofran in the ED. Abdominal CT was obtained and was concerning for no acute findings. She did not have any stones and her appendix was normal.  On reevaluation the patient states that her pain has improved. Patient will be discharged with instructions to follow-up with her primary care doctor in the next few days and return to the ED for worsening symptoms. Patient was given a C. difficile prescription and Naprosyn with Zofran for home use. She was again encouraged to follow-up with her primary care doctor and return to the ED for worsening symptoms. Patient was ultimately discharged with all questions answered. Is this patient to be included in the SEP-1 Core Measure due to severe sepsis or septic shock? No   Exclusion criteria - the patient is NOT to be included for SEP-1 Core Measure due to: Infection is not suspected       Patient was seen during the time of the [place] pandemic. N95 and appropriate PPE was worn during the visit. The patient tolerated their visit well. I have evaluated this patient. My supervising physician was available for consultation. The patient and / or the family were informed of the results of any tests, a time was given to answer questions, a plan was proposed and they agreed with plan. FINAL IMPRESSION      1. Flank pain    2. Renal colic    3. Nausea    4. Diarrhea, unspecified type          DISPOSITION/PLAN   DISPOSITION Decision To Discharge 12/03/2022 07:11:25 PM      PATIENT REFERRED TO:  Vance Harding MD  [address] [facility]   [address] [place] [address]  [phone]    Schedule an appointment as soon as possible for a visit in 2 days  for re-evaluation    [facility] [place] [facility]  [address] [place] [address]    If symptoms worsen    Anna Mayfield MD  [address] [facility] [place] [address] [facility] [address]  [phone]    Schedule an appointment as soon as possible for a visit in 1 week  If symptoms worsen, for re-evaluation      DISCHARGE MEDICATIONS:  Discharge Medication List as of 12/3/2022  6:56 PM        START taking these medications    Details   ondansetron (ZOFRAN) 4 MG tablet Take 1 tablet by mouth every 8 hours as needed for Nausea, Disp-20 tablet, R-0Print      naproxen (NAPROSYN) 250 MG tablet Take 1 tablet by mouth 2 times daily (with meals), Disp-20 tablet, R-1Print             DISCONTINUED MEDICATIONS:  Discharge Medication List as of 12/3/2022  6:56 PM        STOP taking these medications       NONFORMULARY Comments:   Reason for Stopping:         ondansetron (ZOFRAN ODT) 4 MG disintegrating tablet Comments:   Reason for Stopping:         ondansetron (ZOFRAN ODT) 4 MG disintegrating tablet Comments:   Reason for Stopping:         DULoxetine (CYMBALTA) 30 MG extended release capsule Comments:   Reason for Stopping:                      (Please note that portions of this note were completed with a voice recognition program.  Efforts were made to edit the dictations but occasionally words are mis-transcribed.)    ALVARO Mcleod CNP (electronically signed)         ALVARO Mcleod CNP  12/03/22 1945

## 2023-06-09 NOTE — BH NOTE
[address] [facility]  Day 3 Interdisciplinary Treatment Plan NOTE    Review Date & Time: 5/18/2020 0915    Patient was not in treatment team    Admission Type:   Admission Type: Voluntary    Reason for admission:  Reason for Admission: threatened to committ suicide, overhwlemed, multiple stressors. Estimated Length of Stay Update:  1 to 2 days  Estimated Discharge Date Update: 5/19/20 to 5/20/20    PATIENT STRENGTHS:  Patient Strengths Strengths: Communication, Positive Support, Connection to output provider, Social Skills, Employment  Patient Strengths and Limitations:Limitations: Tendency to isolate self, External locus of control  Addictive Behavior:Addictive Behavior  In the past 3 months, have you felt or has someone told you that you have a problem with:  : Eating (too much/too little)  Do you have a history of Chemical Use?: No  Do you have a history of Alcohol Use?: No  Do you have a history of Street Drug Abuse?: Yes  Histroy of Prescripton Drug Abuse?: No  Medical Problems:  Past Medical History:   Diagnosis Date    Depression     Lissa-Danlos syndrome        Risk:  Fall RiskTotal: 81  Luis Eduardo Scale Luis Eduardo Scale Score: 22  BVC Total: 0  Change in scoresnone.  Changes to plan of Care none    Status EXAM:   Status and Exam  Normal: No  Facial Expression: Sad, Worried  Affect: Congruent  Level of Consciousness: Alert  Mood:Normal: No  Mood: Depressed, Anxious  Motor Activity:Normal: Yes  Interview Behavior: Cooperative  Preception: [place] to Person, [place] to Place, Dawson to Situation, Dawson to Time  Attention:Normal: Yes  Thought Content:Normal: Yes  Hallucinations: None  Delusions: No  Memory:Normal: No  Memory: Poor Recent  Insight and Judgment: No  Insight and Judgment: Poor Judgment  Present Suicidal Ideation: No  Present Homicidal Ideation: No    Daily Assessment Last Entry:   Daily Sleep (WDL): Within Defined Limits         Patient Currently in Pain: Denies  Daily Nutrition (WDL): Within No.